# Patient Record
Sex: FEMALE | Race: OTHER | Employment: UNEMPLOYED | URBAN - METROPOLITAN AREA
[De-identification: names, ages, dates, MRNs, and addresses within clinical notes are randomized per-mention and may not be internally consistent; named-entity substitution may affect disease eponyms.]

---

## 2019-02-08 ENCOUNTER — HOSPITAL ENCOUNTER (EMERGENCY)
Age: 34
Discharge: HOME | End: 2019-02-08
Attending: EMERGENCY MEDICINE | Admitting: EMERGENCY MEDICINE
Payer: SUBSIDIZED

## 2019-02-08 VITALS
BODY MASS INDEX: 31.28 KG/M2 | HEIGHT: 62 IN | WEIGHT: 170 LBS | TEMPERATURE: 97.7 F | DIASTOLIC BLOOD PRESSURE: 94 MMHG | RESPIRATION RATE: 18 BRPM | SYSTOLIC BLOOD PRESSURE: 171 MMHG | OXYGEN SATURATION: 98 % | HEART RATE: 74 BPM

## 2019-02-08 DIAGNOSIS — G51.0 BELL'S PALSY: Primary | ICD-10-CM

## 2019-02-08 LAB
APPEARANCE UR: CLEAR
BACTERIA URNS QL MICRO: NEGATIVE /HPF
BILIRUB UR QL: NEGATIVE
COLOR UR: ABNORMAL
EPITH CASTS URNS QL MICRO: ABNORMAL /LPF
GLUCOSE UR STRIP.AUTO-MCNC: >1000 MG/DL
HCG UR QL: NEGATIVE
HGB UR QL STRIP: NEGATIVE
HYALINE CASTS URNS QL MICRO: ABNORMAL /LPF (ref 0–5)
KETONES UR QL STRIP.AUTO: NEGATIVE MG/DL
LEUKOCYTE ESTERASE UR QL STRIP.AUTO: NEGATIVE
NITRITE UR QL STRIP.AUTO: NEGATIVE
PH UR STRIP: 6 [PH] (ref 5–8)
PROT UR STRIP-MCNC: NEGATIVE MG/DL
RBC #/AREA URNS HPF: ABNORMAL /HPF (ref 0–5)
SP GR UR REFRACTOMETRY: 1.01 (ref 1–1.03)
UR CULT HOLD, URHOLD: NORMAL
UROBILINOGEN UR QL STRIP.AUTO: 0.2 EU/DL (ref 0.2–1)
WBC URNS QL MICRO: ABNORMAL /HPF (ref 0–4)

## 2019-02-08 PROCEDURE — 81025 URINE PREGNANCY TEST: CPT

## 2019-02-08 PROCEDURE — 81001 URINALYSIS AUTO W/SCOPE: CPT

## 2019-02-08 PROCEDURE — 99284 EMERGENCY DEPT VISIT MOD MDM: CPT

## 2019-02-08 PROCEDURE — 99283 EMERGENCY DEPT VISIT LOW MDM: CPT

## 2019-02-08 PROCEDURE — 74011250637 HC RX REV CODE- 250/637: Performed by: EMERGENCY MEDICINE

## 2019-02-08 RX ORDER — ACYCLOVIR 800 MG/1
800 TABLET ORAL
Qty: 35 TAB | Refills: 0 | Status: SHIPPED | OUTPATIENT
Start: 2019-02-08 | End: 2019-02-15

## 2019-02-08 RX ORDER — ACYCLOVIR 800 MG/1
800 TABLET ORAL
Status: COMPLETED | OUTPATIENT
Start: 2019-02-08 | End: 2019-02-08

## 2019-02-08 RX ADMIN — ACYCLOVIR 800 MG: 800 TABLET ORAL at 11:26

## 2019-02-08 NOTE — ED PROVIDER NOTES
'started on 1/31/ seen at [facility] on 2/2, 'told has bells palsy'; 'not getting better'; no eye pain/ redness/  c/o 
pt denies numbness/ trauma, vison changes, diff swallowing, CP, SOB, Abd pain, F/Ch, N/V, D/Cons or other current systemic complaints No past medical history on file. No past surgical history on file. No family history on file. Social History Socioeconomic History  Marital status:  Spouse name: Not on file  Number of children: Not on file  Years of education: Not on file  Highest education level: Not on file Social Needs  Financial resource strain: Not on file  Food insecurity - worry: Not on file  Food insecurity - inability: Not on file  Transportation needs - medical: Not on file  Transportation needs - non-medical: Not on file Occupational History  Not on file Tobacco Use  Smoking status: Not on file Substance and Sexual Activity  Alcohol use: Not on file  Drug use: Not on file  Sexual activity: Not on file Other Topics Concern  Not on file Social History Narrative  Not on file ALLERGIES: Patient has no known allergies. Review of Systems Constitutional: Negative for appetite change, diaphoresis and fever. HENT: Negative for trouble swallowing and voice change. Respiratory: Negative for chest tightness and shortness of breath. Gastrointestinal: Negative for abdominal pain. Genitourinary: Negative for dysuria. Neurological: Positive for facial asymmetry and numbness. Negative for headaches. Vitals:  
 02/08/19 1012 BP: (!) 171/94 Pulse: 74 Resp: 18 Temp: 97.7 °F (36.5 °C) SpO2: 98% Weight: 77.1 kg (170 lb) Height: 5' 2\" (1.575 m) Physical Exam  
Constitutional: She is oriented to person, place, and time. She appears well-developed and well-nourished.   
NAD, AxOx4, speaking in complete sentences, L facial droop/ cant wrinkle forehead/ exam consistent w/ Jarratt; HENT:  
Head: Normocephalic and atraumatic. Right Ear: External ear normal.  
Left Ear: External ear normal.  
Nose: Nose normal.  
Mouth/Throat: Oropharynx is clear and moist.  
Cn intact Eyes: Conjunctivae and EOM are normal. Pupils are equal, round, and reactive to light. Right eye exhibits no discharge. Left eye exhibits no discharge. No scleral icterus. Neck: Normal range of motion. Neck supple. No JVD present. No tracheal deviation present. Cardiovascular: Normal rate, regular rhythm, normal heart sounds and intact distal pulses. Exam reveals no gallop and no friction rub. No murmur heard. Pulmonary/Chest: Effort normal and breath sounds normal. No respiratory distress. She has no wheezes. She has no rales. She exhibits no tenderness. Abdominal: Soft. Bowel sounds are normal. There is no tenderness. There is no rebound and no guarding. Genitourinary: Rectal exam shows guaiac negative stool. No vaginal discharge found. Genitourinary Comments: Pt denies urinary/ vaginal complaints Musculoskeletal: Normal range of motion. She exhibits no edema, tenderness or deformity. Neurological: She is alert and oriented to person, place, and time. No cranial nerve deficit. She exhibits normal muscle tone. Coordination normal.  
pt has motor/ CV/ Sensation grossly intact to all extremities, R = L in strength;  
Skin: Skin is warm and dry. No rash noted. No erythema. No pallor. Psychiatric: She has a normal mood and affect. Her behavior is normal. Thought content normal.  
Nursing note and vitals reviewed. MDM Procedures 11:05 AM L facial droop consistent w/ bells; start acyclovir neurology follow-up; Lani Sharpe  results have been reviewed with her. She has been counseled regarding her diagnosis.   She verbally conveys understanding and agreement of the signs, symptoms, diagnosis, treatment and prognosis and additionally agrees to Call/ Arrange follow up as recommended with Dr. None in 24 - 48 hours. She also agrees with the care-plan and conveys that all of her questions have been answered. I have also put together some discharge instructions for her that include: 1) educational information regarding their diagnosis, 2) how to care for their diagnosis at home, as well a 3) list of reasons why they would want to return to the ED prior to their follow-up appointment, should their condition change or for concerns.

## 2019-02-08 NOTE — ED TRIAGE NOTES
Pt arrives with partial paralysis on left side of face that started on 2/2/19. Reports that face is painful, which started Jan 31 before paralysis started. Was dx with Bell's Palsy at [facility] and was prescribed medication for pain (she thinks ibuprofen).

## 2019-02-08 NOTE — DISCHARGE INSTRUCTIONS
Patient Education        Bell's Palsy: Care Instructions  Your Care Instructions    Bell's palsy is paralysis or weakness of the muscles on one side of the face. Often people with Bell's palsy have a droop on one side of the mouth and have trouble completely shutting the eye on the same side. Bell's palsy can also interfere with the sense of taste. These things happen when a nerve in your face becomes inflamed. Bell's palsy is not caused by a stroke. The cause of the nerve inflammation is not known. But some experts think that a virus may cause it. Because of this, doctors sometimes prescribe antiviral medicine to treat it. You also may get medicine to reduce swelling. Bell's palsy usually gets better on its own in a few weeks or months. Follow-up care is a key part of your treatment and safety. Be sure to make and go to all appointments, and call your doctor if you are having problems. It's also a good idea to know your test results and keep a list of the medicines you take. How can you care for yourself at home? · Take your medicines exactly as prescribed. Call your doctor if you think you are having a problem with your medicine. You will get more details on the specific medicines your doctor prescribes. · Use artificial tears or ointment if your eyes are too dry. Bell's palsy can make your lower eyelid droop, causing a dry eye. · If you cannot completely close your eye, consider using an eye patch while you sleep. · Help yourself blink by using your finger to close and open your eyelid. This may help keep your eye moist.  · Wear glasses or goggles to keep dust and dirt out of your eye. · As feeling comes back to your face, massage your forehead, cheeks, and lips. Massage may make the muscles in your face stronger. · Brush and floss your teeth often to help prevent tooth decay. Bell's palsy can dry up the spit on one side of your mouth. This increases the risk of tooth decay.   When should you call for help?  Call 911 anytime you think you may need emergency care. For example, call if:    · You have symptoms of a stroke. These may include:  ? Sudden numbness, tingling, weakness, or loss of movement in your face, arm, or leg, especially on only one side of your body. ? Sudden vision changes. ? Sudden trouble speaking. ? Sudden confusion or trouble understanding simple statements. ? Sudden problems with walking or balance. ? A sudden, severe headache that is different from past headaches.    Call your doctor now or seek immediate medical care if:    · You have numbness or weakness that spreads beyond one side of your face.     · You have a skin rash or eye pain or redness, or light bothers your eyes.     · You have a new or worse headache.    Watch closely for changes in your health, and be sure to contact your doctor if:    · You do not get better as expected. Where can you learn more? Go to http://melvin-alli.info/. Enter P168 in the search box to learn more about \"Bell's Palsy: Care Instructions. \"  Current as of: Zoila 3, 2018  Content Version: 11.9  © 3144-0081 [employer]. Care instructions adapted under license by [employer] (which disclaims liability or warranty for this information). If you have questions about a medical condition or this instruction, always ask your healthcare professional. [address] any warranty or liability for your use of this information.

## 2019-09-23 ENCOUNTER — OFFICE VISIT (OUTPATIENT)
Dept: OBGYN CLINIC | Age: 34
End: 2019-09-23

## 2019-09-23 VITALS
HEIGHT: 62 IN | WEIGHT: 187 LBS | BODY MASS INDEX: 34.41 KG/M2 | DIASTOLIC BLOOD PRESSURE: 70 MMHG | SYSTOLIC BLOOD PRESSURE: 122 MMHG

## 2019-09-23 DIAGNOSIS — Z34.02 ENCOUNTER FOR SUPERVISION OF NORMAL FIRST PREGNANCY IN SECOND TRIMESTER: Primary | ICD-10-CM

## 2019-09-23 DIAGNOSIS — Z01.419 WELL FEMALE EXAM WITH ROUTINE GYNECOLOGICAL EXAM: ICD-10-CM

## 2019-09-23 DIAGNOSIS — Z34.80 SUPERVISION OF OTHER NORMAL PREGNANCY: ICD-10-CM

## 2019-09-23 RX ORDER — NYSTATIN AND TRIAMCINOLONE ACETONIDE 100000; 1 [USP'U]/G; MG/G
OINTMENT TOPICAL 2 TIMES DAILY
Qty: 30 G | Refills: 0 | Status: SHIPPED | OUTPATIENT
Start: 2019-09-23 | End: 2019-09-30

## 2019-09-23 NOTE — PROGRESS NOTES
Current pregnancy history:    Thea Banks is a ,  26 y.o. female OTHER Patient's last menstrual period was 2019 (exact date). .  She presents for the evaluation of amenorrhea and a positive pregnancy test.    LMP history:  The date of her LMP is not certain. Her last menstrual period was normal and lasted for 4 to 5 days. A urine pregnancy test was positive a few weeks ago. She was not on the pill at conception. By LMP she is 12w4d with an YENNIFER of 20. Ultrasound data:  She had an  ultrasound done by the ultrasound tech today which revealed a viable harrison pregnancy with a gestational age of 16 weeks and 2 days giving an [facility] of 20. Pregnancy symptoms:    Since her LMP she has experienced  urinary frequency, breast tenderness, and nausea. She has not been vomiting over the last few weeks. Associated signs and symptoms which she denies: dysuria, discharge, vaginal bleeding. She states she has gained weight:  Approximately 5 pounds over the last few weeks. Relevant past pregnancy history:   She has the following pregnancy history: Her last pregnancy was uncomplicated. She has no history of  delivery. Relevant past medical history:(relevant to this pregnancy): noncontributory. Substance history: negative for alcohol, tobacco and street drugs. Positive for nothing. Exposure history: There is/are no indoor cat/s in the home. The patient was instructed to not change the cat litter. She admits close contact with children on a regular basis. She has had chicken pox or the vaccine in the past.   Patient denies issues with domestic violence. Genetic Screening/Teratology Counseling: (Includes patient, baby's father, or anyone in either family with:)  3.  Patient's age >/= 28 at [facility]?-- yes  . 2.   Thalassemia ([place], [place],  [address], or  background): MCV<80?--no.     3.  Neural tube defect (meningomyelocele, spina bifida, anencephaly)?--no.   4.  Congenital heart defect?--no.  5.  Down syndrome?--no.   6.  Ulisses-Sachs ([place], [place])?--no.   7.  Canavan's Disease?--no.   8.  Familial Dysautonomia?--no.   9.  Sickle cell disease or trait ()? --no   The patient has not been tested for sickle trait  10. Hemophilia or other blood disorders?--no. 11.  Muscular dystrophy?--no. 12.  Cystic fibrosis?--no. 13.  Rankin's Chorea?--no. 14.  Mental retardation/autism (if yes was person tested for Fragile X)?--no. 15.  Other inherited genetic or chromosomal disorder?--no. 12.  Maternal metabolic disorder (DM, PKU, etc)?--no. 17.  Patient or FOB with a child with a birth defect not listed above?--no.  17a. Patient or FOB with a birth defect themselves?--no. 18.  Recurrent pregnancy loss, or stillbirth?--no. 19.  Any medications since LMP other than prenatal vitamins (include vitamins, supplements, OTC meds, drugs, alcohol)?--no. 20.  Any other genetic/environmental exposure to discuss?--no. Infection History:  1. Lives with someone with TB or TB exposed?--no.   2.  Patient or partner has history of genital herpes?--no.  3.  Rash or viral illness since LMP?--no.    4.  History of STD (GC, CT, HPV, syphilis, HIV)? --no   5. Other: OTHER? No past medical history on file. No past surgical history on file. Social History     Occupational History    Not on file   Tobacco Use    Smoking status: Not on file   Substance and Sexual Activity    Alcohol use: Not on file    Drug use: Not on file    Sexual activity: Not on file     No family history on file.   OB History    Para Term  AB Living   1             SAB TAB Ectopic Molar Multiple Live Births                    # Outcome Date GA Lbr Yosvany/2nd Weight Sex Delivery Anes PTL Lv   1 Current              No Known Allergies  Prior to Admission medications    Not on File        Review of Systems: History obtained from the patient  Constitutional: negative for weight loss, fever, night sweats  HEENT: negative for hearing loss, earache, congestion, snoring, sore throat  CV: negative for chest pain, palpitations, edema  Resp: negative for cough, shortness of breath, wheezing  Breast: negative for breast lumps, nipple discharge, galactorrhea  GI: negative for change in bowel habits, abdominal pain, black or bloody stools  : negative for frequency, dysuria, hematuria, vaginal discharge  MSK: negative for back pain, joint pain, muscle pain  Skin: negative for itching, rash, hives  Neuro: negative for dizziness, headache, confusion, weakness  Psych: negative for anxiety, depression, change in mood  Heme/lymph: negative for bleeding, bruising, pallor    Objective:  Visit Vitals  LMP 01/02/2019 (Exact Date)       Physical Exam:     Constitutional  · Appearance: well-nourished, well developed, alert, in no acute distress    HENT  · Head  · Face: appears normal  · Eyes: appear normal  · Ears: normal  · Mouth: normal  · Lips: no lesions    Neck  · Inspection/Palpation: normal appearance, no masses or tenderness  · Lymph Nodes: no lymphadenopathy present  · Thyroid: gland size normal, nontender, no nodules or masses present on palpation    Chest  · Respiratory Effort: breathing unlabored    Breasts  · Inspection of Breasts: breasts symmetrical, no skin changes, no discharge present, nipple appearance normal, no skin retraction present  · Palpation of Breasts and Axillae: no masses present on palpation, no breast tenderness  · Axillary Lymph Nodes: no lymphadenopathy present    Gastrointestinal  · Abdominal Examination: abdomen non-tender to palpation, normal bowel sounds, no masses present  · Liver and spleen: no hepatomegaly present, spleen not palpable  · Hernias: no hernias identified    Genitourinary  · External Genitalia: normal appearance for age, no discharge present, no tenderness present, no inflammatory lesions present, no masses present, no atrophy present  · Vagina: normal vaginal vault without central or paravaginal defects, no discharge present, no inflammatory lesions present, no masses present  · Bladder: non-tender to palpation  · Urethra: appears normal  · Cervix: normal   · Uterus: enlarged, normal shape, soft  · Adnexa: no adnexal tenderness present, no adnexal masses present  · Perineum: perineum within normal limits, no evidence of trauma, no rashes or skin lesions present  · Anus: anus within normal limits, no hemorrhoids present  · Inguinal Lymph Nodes: no lymphadenopathy present    Skin  · General Inspection: no rash, no lesions identified    Neurologic/Psychiatric  · Mental Status:  · Orientation: grossly oriented to person, place and time  · Mood and Affect: mood normal, affect appropriate    Assessment:   Intrauterine pregnancy with the following problems identified:   [place]  last pregnancy 7 years ago  GDM with last pregnancy- 1 hour glucola at next visit  [facility] referral, Pt declined additional genetic testing/screening stating she would not terminate under any circumstances and would not want to know until after delivery it there was a problem. Plan:     Offered CF testing, CVS, Nuchal Translucency, MSAFP, amnio, and discussed NIPT  Course of pregnancy discussed including visit schedule, routine U/S, glucola testing, etc.  Avoid alcoholic beverages and illicit/recreational drugs use  Take prenatal vitamins or folic acid daily. Hospital and practice style discussed with coverage system. Discussed nutrition, toxoplasmosis precautions, sexual activity, exercise, need for influenza vaccine, environmental and work hazards, travel advice, screen for domestic violence, need for seat belts. Discussed seafood, unpasteurized dairy products, deli meat, artificial sweeteners, and caffeine. Information on prenatal classes/breastfeeding given. Information on circumcision given  Patient encouraged not to smoke.   Discussed current prescription drug use. Given medication list.  Discussed the use of over the counter medications and chemicals. Route of delivery discussed, including risks, benefits, and alternatives of  versus repeat LTCS.   Pt understands risk of hemorrhage during pregnancy and post delivery and would accept blood products if necessary in life-threatening emergencies

## 2019-09-23 NOTE — PATIENT INSTRUCTIONS
Learning About Pregnancy  Your Care Instructions    Your health in the early weeks of your pregnancy is particularly important for your baby's health. Take good care of yourself. Anything you do that harms your body can also harm your baby. Make sure to go to all of your doctor appointments. Regular checkups will help keep you and your baby healthy. How can you care for yourself at home? Diet    · Eat a balanced diet. Make sure your diet includes plenty of beans, peas, and leafy green vegetables.     · Do not skip meals or go for many hours without eating. If you are nauseated, try to eat a small, healthy snack every 2 to 3 hours.     · Do not eat fish that has a high level of mercury, such as shark, swordfish, or mackerel. Do not eat more than one can of tuna each week.     · Drink plenty of fluids, enough so that your urine is light yellow or clear like water. If you have kidney, heart, or liver disease and have to limit fluids, talk with your doctor before you increase the amount of fluids you drink.     · Cut down on caffeine, such as coffee, tea, and cola.     · Do not drink alcohol, such as beer, wine, or hard liquor.     · Take a multivitamin that contains at least 400 micrograms (mcg) of folic acid to help prevent birth defects. Fortified cereal and whole wheat bread are good additional sources of folic acid.     · Increase the calcium in your diet. Try to drink a quart of skim milk each day. You may also take calcium supplements and choose foods such as cheese and yogurt.    Lifestyle    · Make sure you go to your follow-up appointments.     · Get plenty of rest. You may be unusually tired while you are pregnant.     · Get at least 30 minutes of exercise on most days of the week. Walking is a good choice. If you have not exercised in the past, start out slowly. Take several short walks each day.     · Do not smoke. If you need help quitting, talk to your doctor about stop-smoking programs.  These can increase your chances of quitting for good.     · Do not touch cat feces or litter boxes. Also, wash your hands after you handle raw meat, and fully cook all meat before you eat it. Wear gloves when you work in the yard or garden, and wash your hands well when you are done. Cat feces, raw or undercooked meat, and contaminated dirt can cause an infection that may harm your baby or lead to a miscarriage.     · Do not use saunas or hot tubs. Raising your body temperature may harm your baby.     · Avoid chemical fumes, paint fumes, or poisons.     · Do not use illegal drugs or alcohol. Medicines    · Review all of your medicines with your doctor. Some of your routine medicines may need to be changed to protect your baby.     · Use acetaminophen (Tylenol) to relieve minor problems, such as a mild headache or backache or a mild fever with cold symptoms. Do not use nonsteroidal anti-inflammatory drugs (NSAIDs), such as ibuprofen (Advil, Motrin) or naproxen (Aleve), unless your doctor says it is okay.     · Do not take two or more pain medicines at the same time unless the doctor told you to. Many pain medicines have acetaminophen, which is Tylenol. Too much acetaminophen (Tylenol) can be harmful.     · Take your medicines exactly as prescribed. Call your doctor if you think you are having a problem with your medicine.    To manage morning sickness    · If you feel sick when you first wake up, try eating a small snack (such as crackers) before you get out of bed. Allow some time to digest the snack, and then get out of bed slowly.     · Do not skip meals or go for long periods without eating. An empty stomach can make nausea worse.     · Eat small, frequent meals instead of three large meals each day.     · Drink plenty of fluids.  Sports drinks, such as Gatorade or Powerade, are good choices.     · Eat foods that are high in protein but low in fat.     · If you are taking iron supplements, ask your doctor if they are necessary. Iron can make nausea worse.     · Avoid any smells, such as coffee, that make you feel sick.     · Get lots of rest. Morning sickness may be worse when you are tired. Follow-up care is a key part of your treatment and safety. Be sure to make and go to all appointments, and call your doctor if you are having problems. It's also a good idea to know your test results and keep a list of the medicines you take. Where can you learn more? Go to http://melvin-alli.info/. Enter W361 in the search box to learn more about \"Learning About Pregnancy. \"  Current as of: May 29, 2019  Content Version: 12.2  © 0399-2410 [employer]. Care instructions adapted under license by [email or web address] (which disclaims liability or warranty for this information). If you have questions about a medical condition or this instruction, always ask your healthcare professional. [address] any warranty or liability for your use of this information.

## 2019-09-24 LAB
ABO GROUP BLD: NORMAL
BLD GP AB SCN SERPL QL: NEGATIVE
ERYTHROCYTE [DISTWIDTH] IN BLOOD BY AUTOMATED COUNT: 13 % (ref 12.3–15.4)
HBV SURFACE AG SERPL QL IA: NEGATIVE
HCT VFR BLD AUTO: 42.3 % (ref 34–46.6)
HGB BLD-MCNC: 14.6 G/DL (ref 11.1–15.9)
HIV 1+2 AB+HIV1 P24 AG SERPL QL IA: NON REACTIVE
MCH RBC QN AUTO: 32.2 PG (ref 26.6–33)
MCHC RBC AUTO-ENTMCNC: 34.5 G/DL (ref 31.5–35.7)
MCV RBC AUTO: 93 FL (ref 79–97)
PLATELET # BLD AUTO: 243 X10E3/UL (ref 150–450)
RBC # BLD AUTO: 4.54 X10E6/UL (ref 3.77–5.28)
RH BLD: POSITIVE
RPR SER QL: NON REACTIVE
RUBV IGG SERPL IA-ACNC: 3.16 INDEX
WBC # BLD AUTO: 9.5 X10E3/UL (ref 3.4–10.8)

## 2019-09-25 LAB — BACTERIA UR CULT: NO GROWTH

## 2019-09-26 LAB
C TRACH RRNA CVX QL NAA+PROBE: NEGATIVE
CYTOLOGIST CVX/VAG CYTO: NORMAL
CYTOLOGY CVX/VAG DOC CYTO: NORMAL
CYTOLOGY CVX/VAG DOC THIN PREP: NORMAL
DX ICD CODE: NORMAL
HPV I/H RISK 1 DNA CVX QL PROBE+SIG AMP: NEGATIVE
Lab: NORMAL
N GONORRHOEA RRNA CVX QL NAA+PROBE: NEGATIVE
OTHER STN SPEC: NORMAL
STAT OF ADQ CVX/VAG CYTO-IMP: NORMAL
T VAGINALIS RRNA SPEC QL NAA+PROBE: NEGATIVE

## 2019-10-21 ENCOUNTER — ROUTINE PRENATAL (OUTPATIENT)
Dept: OBGYN CLINIC | Age: 34
End: 2019-10-21

## 2019-10-21 VITALS — SYSTOLIC BLOOD PRESSURE: 120 MMHG | WEIGHT: 187 LBS | BODY MASS INDEX: 34.2 KG/M2 | DIASTOLIC BLOOD PRESSURE: 82 MMHG

## 2019-10-21 DIAGNOSIS — O09.522 AMA (ADVANCED MATERNAL AGE) MULTIGRAVIDA 35+, SECOND TRIMESTER: Primary | ICD-10-CM

## 2019-10-21 DIAGNOSIS — Z34.80 SUPERVISION OF OTHER NORMAL PREGNANCY: ICD-10-CM

## 2019-10-21 DIAGNOSIS — O24.419 GESTATIONAL DIABETES MELLITUS (GDM) IN SECOND TRIMESTER, GESTATIONAL DIABETES METHOD OF CONTROL UNSPECIFIED: ICD-10-CM

## 2019-10-21 NOTE — PROGRESS NOTES
Pt doing well, see prenatal flowsheet. msafp today  Pt declined additional genetic testing/screening stating she would not terminate under any circumstances and would not want to know until after delivery it there was a problem.

## 2019-10-23 LAB
AFP ADJ MOM SERPL: 0.41
AFP INTERP SERPL-IMP: NORMAL
AFP INTERP SERPL-IMP: NORMAL
AFP SERPL-MCNC: 11.7 NG/ML
AGE AT DELIVERY: 36.3 YR
COMMENT, 018013: NORMAL
GA METHOD: NORMAL
GA: 16 WEEKS
GLUCOSE 1H P 50 G GLC PO SERPL-MCNC: 335 MG/DL (ref 65–129)
IDDM PATIENT QL: NO
MULTIPLE PREGNANCY: NO
NEURAL TUBE DEFECT RISK FETUS: NORMAL %
RESULTS, 017004: NORMAL

## 2019-10-24 ENCOUNTER — TELEPHONE (OUTPATIENT)
Dept: DIABETES SERVICES | Age: 34
End: 2019-10-24

## 2019-10-24 NOTE — PROGRESS NOTES
Pt notified and referrals sent to [facility], [facility], and endo. Pt will reach out to me if she has not heard from any of these specialities by next week.

## 2019-10-24 NOTE — TELEPHONE ENCOUNTER
I used the language line to place a call to this patient's cell phone in an effort to schedule an appointment. The patient needs to be seen for gestational diabetes. No one answered so a VM was left.

## 2019-11-04 ENCOUNTER — OFFICE VISIT (OUTPATIENT)
Dept: ENDOCRINOLOGY | Age: 34
End: 2019-11-04

## 2019-11-04 VITALS
BODY MASS INDEX: 34.61 KG/M2 | RESPIRATION RATE: 18 BRPM | HEIGHT: 62 IN | HEART RATE: 81 BPM | OXYGEN SATURATION: 98 % | TEMPERATURE: 97.8 F | DIASTOLIC BLOOD PRESSURE: 76 MMHG | SYSTOLIC BLOOD PRESSURE: 135 MMHG | WEIGHT: 188.1 LBS

## 2019-11-04 DIAGNOSIS — E11.65 UNCONTROLLED TYPE 2 DIABETES MELLITUS WITH HYPERGLYCEMIA (HCC): ICD-10-CM

## 2019-11-04 DIAGNOSIS — O24.414 INSULIN CONTROLLED GESTATIONAL DIABETES MELLITUS (GDM) IN SECOND TRIMESTER: Primary | ICD-10-CM

## 2019-11-04 LAB — HBA1C MFR BLD HPLC: 8.6 %

## 2019-11-04 RX ORDER — SYRINGE AND NEEDLE,INSULIN,1ML 31GX15/64"
SYRINGE, EMPTY DISPOSABLE MISCELLANEOUS
Qty: 50 PEN NEEDLE | Refills: 6 | Status: SHIPPED | OUTPATIENT
Start: 2019-11-04

## 2019-11-04 NOTE — PROGRESS NOTES
HISTORY OF PRESENT ILLNESS  Laurel Medrano is a 26 y.o. female. HPI  Initial visit for  GDM   Referred by Martín Gill       Thru 17 weeks of GA    live children 1 miscarriage     3 and 4 the pregnancies - she had diabetes ,   3rd - diet only in [place]  13years old  [place]ND   [place]    [place]    [address] [place] [address]    - 8years old  FTND       LMP -  2019   YENNIFER -  2019     Pt had a hospital admission  In [place]  in 2016 and was told high sugars ,but later never had any appts       .     Past Medical History:   Diagnosis Date    Gestational diabetes mellitus (GDM)        Social History     Socioeconomic History    Marital status:      Spouse name: Not on file    Number of children: Not on file    Years of education: Not on file    Highest education level: Not on file   Occupational History    Not on file   Social Needs    Financial resource strain: Not on file    Food insecurity:     Worry: Not on file     Inability: Not on file    Transportation needs:     Medical: Not on file     Non-medical: Not on file   Tobacco Use    Smoking status: Never Smoker    Smokeless tobacco: Never Used   Substance and Sexual Activity    Alcohol use: Never     Frequency: Never    Drug use: Never    Sexual activity: Yes     Partners: Male     Birth control/protection: None   Lifestyle    Physical activity:     Days per week: Not on file     Minutes per session: Not on file    Stress: Not on file   Relationships    Social connections:     Talks on phone: Not on file     Gets together: Not on file     Attends Anglican service: Not on file     Active member of club or organization: Not on file     Attends meetings of clubs or organizations: Not on file     Relationship status: Not on file    Intimate partner violence:     Fear of current or ex partner: Not on file     Emotionally abused: Not on file     Physically abused: Not on file     Forced sexual activity: Not on file   Other Topics Concern    Not on file   Social History Narrative    Not on file       Family History   Problem Relation Age of Onset    Hypertension Mother     Diabetes Mother        Review of Systems   Constitutional: Negative. HENT: Negative. Eyes: Negative. Respiratory: Negative. Cardiovascular: Negative. Gastrointestinal: Negative. Genitourinary: Negative. Musculoskeletal: Negative. Skin: Negative. Neurological: Negative. Endo/Heme/Allergies: Negative. Psychiatric/Behavioral: Negative. Physical Exam   Constitutional: She is oriented to person, place, and time. She appears well-developed and well-nourished. HENT:   Head: Normocephalic. Eyes: Pupils are equal, round, and reactive to light. Conjunctivae and EOM are normal.   Neck: Normal range of motion. Neck supple. Cardiovascular: Normal rate and regular rhythm. Pulmonary/Chest: Effort normal and breath sounds normal.   Abdominal: Soft. Bowel sounds are normal.   Musculoskeletal: Normal range of motion. Neurological: She is alert and oriented to person, place, and time. She has normal reflexes. Skin: Skin is warm and dry. Psychiatric: She has a normal mood and affect. ONE HOUR GLUCOLA SCREENING TEST  MG      ASSESSMENT and PLAN    1. Gestational diabetes  : thru   17 weeks  GA   No A1c to go by - could not be done   No sugar log to go by   Discussed patho-physiology of DM  during pregnancy     She is educated about the importance of glycemic control for healthy baby and safe delivery. She is told to check blood sugars before meals and one hour after meals and at bed time on some days (2 hours post dinner). She is educated about the targets being different during pregnancy   Fasting below 90 mg and one hour post prandial being below 130 mg     She is started on basal insulin   She is told to drop off her log/meter for review.     She will call office if blood sugars are staying about the target range for 2 consecutive days     She is educated about possibility of worsening of retinopathy with aggressive glycemic control.               2. Type 2 diabetes uncontrolled  : a1c is over 7 %  By POC   Ran the a1c anyway to get an idea   Discussed DM 2 patho-physiology extensively   Reviewed the glucose log : no  Started on NPH insulin  at bedtime. Patient is advised about checking blood sugars 4 times a day and maintaining log book. The danger of having low blood sugars has been explained with inappropriate use of insulin  Patient voiced understanding and using the printed instructions at home. Hypoglycemia management has been explained to the patient. Carbohydrate counting discussed with the patient     lab results and schedule of future lab studies reviewed with patient  specific diabetic recommendations: diabetic diet discussed in detail, written exchange diet given, home glucose monitoring emphasized, home glucose monitoring demonstrated and taught, all medications, side effects and compliance discussed carefully, use and side effects of insulin is taught, foot care discussed and Podiatry visits discussed, annual eye examinations at Ophthalmology discussed, glycohemoglobin and other lab monitoring discussed, long term diabetic complications discussed and labs immediately prior to next visit    2. Hypoglycemia :  Educated on treating the hypoglycemia.       > 50 % of time is spent on counseling   Patient voiced understanding her plan of care

## 2019-11-04 NOTE — Clinical Note
11/10/19 Patient: Rosa Valdovinos YOB: 1985 Date of Visit: 11/4/2019 None None ([phone]) Patient Stated That They Have No Pcp 
VIA Dear None, Thank you for referring Ms. Rosa Valdovinos to [address] [place] [address] for evaluation. My notes for this consultation are attached. If you have questions, please do not hesitate to call me. I look forward to following your patient along with you. Sincerely, Asmita Ferrell MD

## 2019-11-04 NOTE — PROGRESS NOTES
Room 3    Identified pt with two pt identifiers(name and ). Reviewed record in preparation for visit and have obtained necessary documentation. All patient medications has been reviewed. Chief Complaint   Patient presents with    Gestational Diabetes     17 weeks       Health Maintenance Due   Topic    DTaP/Tdap/Td series (1 - Tdap)    Influenza Age 5 to Adult        Vitals:    19 1106   BP: 135/76   Pulse: 81   Resp: 18   Temp: 97.8 °F (36.6 °C)   TempSrc: Oral   SpO2: 98%   Weight: 188 lb 1.6 oz (85.3 kg)   Height: 5' 2\" (1.575 m)   PainSc:   0 - No pain   LMP: 2019       Wt Readings from Last 3 Encounters:   19 188 lb 1.6 oz (85.3 kg)   10/21/19 187 lb (84.8 kg)   19 187 lb (84.8 kg)     Temp Readings from Last 3 Encounters:   19 97.8 °F (36.6 °C) (Oral)   19 97.7 °F (36.5 °C)     BP Readings from Last 3 Encounters:   19 135/76   10/21/19 120/82   19 122/70     Pulse Readings from Last 3 Encounters:   19 81   19 74       No results found for: HBA1C, HGBE8, IIO2EETJ, RFV9OPQI, BYD4XCNI    Coordination of Care Questionnaire:   1) Have you been to an emergency room, urgent care, or hospitalized since your last visit?   no       2. Have seen or consulted any other health care provider since your last visit? NO    3) Do you have an Advanced Directive/ Living Will in place? NO  If yes, do we have a copy on file NO  If no, would you like information NO    Patient is accompanied by mother and daughter I have received verbal consent from Alex Otto to discuss any/all medical information while they are present in the room.

## 2019-11-04 NOTE — PATIENT INSTRUCTIONS
Do not skip meals  Do not eat in between meals    Reduce carbs- pasta, rice, potatoes, bread   Do not drink juices or sodas  Do not eat peanut butter     Do not eat sugar free cookies and cakes   Do not eat peaches,  pineapples, grapes, oranges  And fruit medleys       ---------------------------------------------------------------------------------------------------------------------------------      [employer] reli-on machine    Test strips       Start on novolin NPH insulin   16 units at bed time   ( reli-on)

## 2019-11-04 NOTE — LETTER
11/10/2019 7:19 PM 
 
Patient:  Wagner Connors YOB: 1985 Date of Visit: 2019 Dear No Recipients: Thank you for referring Ms. Wagner Connors to me for evaluation/treatment. Below are the relevant portions of my assessment and plan of care. Room 3 Identified pt with two pt identifiers(name and ). Reviewed record in preparation for visit and have obtained necessary documentation. All patient medications has been reviewed. Chief Complaint Patient presents with  Gestational Diabetes 17 weeks Health Maintenance Due Topic  DTaP/Tdap/Td series (1 - Tdap)  Influenza Age 5 to Adult Vitals:  
 19 1106 BP: 135/76 Pulse: 81 Resp: 18 Temp: 97.8 °F (36.6 °C) TempSrc: Oral  
SpO2: 98% Weight: 188 lb 1.6 oz (85.3 kg) Height: 5' 2\" (1.575 m) PainSc:   0 - No pain LMP: 2019 Wt Readings from Last 3 Encounters:  
19 188 lb 1.6 oz (85.3 kg) 10/21/19 187 lb (84.8 kg) 19 187 lb (84.8 kg) Temp Readings from Last 3 Encounters:  
19 97.8 °F (36.6 °C) (Oral) 19 97.7 °F (36.5 °C) BP Readings from Last 3 Encounters:  
19 135/76  
10/21/19 120/82  
19 122/70 Pulse Readings from Last 3 Encounters:  
19 81  
19 74 No results found for: HBA1C, HGBE8, QYG3ZPQZ, KAK3GBII, JTW6DDYZ Coordination of Care Questionnaire:  
1) Have you been to an emergency room, urgent care, or hospitalized since your last visit?   no    
 
2. Have seen or consulted any other health care provider since your last visit? NO 
 
3) Do you have an Advanced Directive/ Living Will in place? NO If yes, do we have a copy on file NO If no, would you like information NO Patient is accompanied by mother and daughter I have received verbal consent from Wagner Connors to discuss any/all medical information while they are present in the room. HISTORY OF PRESENT ILLNESS Mariola Singh is a 26 y.o. female. HPI Initial visit for  GDM Referred by Sj Henley Thru 17 weeks of GA  
 live children 1 miscarriage 3 and 4 the pregnancies - she had diabetes ,  
3rd - diet only in [place]  13years old  [place]ND  
[place]    [facility]    - 8years old  FTND  
 
 
LMP -  2019 YENNIFER -  2019 Pt had a hospital admission  In [place]  in 2016 and was told high sugars ,but later never had any appts Aditya Spike Past Medical History:  
Diagnosis Date  Gestational diabetes mellitus (GDM) Social History Socioeconomic History  Marital status:  Spouse name: Not on file  Number of children: Not on file  Years of education: Not on file  Highest education level: Not on file Occupational History  Not on file Social Needs  Financial resource strain: Not on file  Food insecurity:  
  Worry: Not on file Inability: Not on file  Transportation needs:  
  Medical: Not on file Non-medical: Not on file Tobacco Use  Smoking status: Never Smoker  Smokeless tobacco: Never Used Substance and Sexual Activity  Alcohol use: Never Frequency: Never  Drug use: Never  Sexual activity: Yes  
  Partners: Male Birth control/protection: None Lifestyle  Physical activity:  
  Days per week: Not on file Minutes per session: Not on file  Stress: Not on file Relationships  Social connections:  
  Talks on phone: Not on file Gets together: Not on file Attends Anabaptist service: Not on file Active member of club or organization: Not on file Attends meetings of clubs or organizations: Not on file Relationship status: Not on file  Intimate partner violence:  
  Fear of current or ex partner: Not on file Emotionally abused: Not on file Physically abused: Not on file Forced sexual activity: Not on file Other Topics Concern  Not on file Social History Narrative  Not on file Family History Problem Relation Age of Onset  Hypertension Mother  Diabetes Mother Review of Systems Constitutional: Negative. HENT: Negative. Eyes: Negative. Respiratory: Negative. Cardiovascular: Negative. Gastrointestinal: Negative. Genitourinary: Negative. Musculoskeletal: Negative. Skin: Negative. Neurological: Negative. Endo/Heme/Allergies: Negative. Psychiatric/Behavioral: Negative. Physical Exam  
Constitutional: She is oriented to person, place, and time. She appears well-developed and well-nourished. HENT:  
Head: Normocephalic. Eyes: Pupils are equal, round, and reactive to light. Conjunctivae and EOM are normal.  
Neck: Normal range of motion. Neck supple. Cardiovascular: Normal rate and regular rhythm. Pulmonary/Chest: Effort normal and breath sounds normal.  
Abdominal: Soft. Bowel sounds are normal.  
Musculoskeletal: Normal range of motion. Neurological: She is alert and oriented to person, place, and time. She has normal reflexes. Skin: Skin is warm and dry. Psychiatric: She has a normal mood and affect. ONE HOUR GLUCOLA SCREENING TEST  MG 
 
 
ASSESSMENT and PLAN 1. Gestational diabetes  : thru   17 weeks  GA No A1c to go by - could not be done No sugar log to go by Discussed patho-physiology of DM  during pregnancy She is educated about the importance of glycemic control for healthy baby and safe delivery. She is told to check blood sugars before meals and one hour after meals and at bed time on some days (2 hours post dinner). She is educated about the targets being different during pregnancy Fasting below 90 mg and one hour post prandial being below 130 mg She is started on basal insulin She is told to drop off her log/meter for review. She will call office if blood sugars are staying about the target range for 2 consecutive days She is educated about possibility of worsening of retinopathy with aggressive glycemic control. 
 
  
 
 
 
 
2. Type 2 diabetes uncontrolled  : a1c is over 7 %  By POC Ran the a1c anyway to get an idea Discussed DM 2 patho-physiology extensively Reviewed the glucose log : no 
Started on NPH insulin  at bedtime. Patient is advised about checking blood sugars 4 times a day and maintaining log book. The danger of having low blood sugars has been explained with inappropriate use of insulin  Patient voiced understanding and using the printed instructions at home. Hypoglycemia management has been explained to the patient. Carbohydrate counting discussed with the patient  
 
lab results and schedule of future lab studies reviewed with patient 
specific diabetic recommendations: diabetic diet discussed in detail, written exchange diet given, home glucose monitoring emphasized, home glucose monitoring demonstrated and taught, all medications, side effects and compliance discussed carefully, use and side effects of insulin is taught, foot care discussed and Podiatry visits discussed, annual eye examinations at Ophthalmology discussed, glycohemoglobin and other lab monitoring discussed, long term diabetic complications discussed and labs immediately prior to next visit 2. Hypoglycemia :  Educated on treating the hypoglycemia. > 50 % of time is spent on counseling Patient voiced understanding her plan of care If you have questions, please do not hesitate to call me. I look forward to following Ms. Lorelei Armando along with you. Sincerely, Elana Gustafson MD

## 2019-11-04 NOTE — LETTER
11/10/2019 7:20 PM 
 
Patient:  Aleksandr Santiago YOB: 1985 Date of Visit: 2019 Dear MD Delvin Gandhi [address] [facility] [phone] VIA In Basket 
 : Thank you for referring Ms. Aleksandr Santiago to me for evaluation/treatment. Below are the relevant portions of my assessment and plan of care. Room 3 Identified pt with two pt identifiers(name and ). Reviewed record in preparation for visit and have obtained necessary documentation. All patient medications has been reviewed. Chief Complaint Patient presents with  Gestational Diabetes 17 weeks Health Maintenance Due Topic  DTaP/Tdap/Td series (1 - Tdap)  Influenza Age 5 to Adult Vitals:  
 19 1106 BP: 135/76 Pulse: 81 Resp: 18 Temp: 97.8 °F (36.6 °C) TempSrc: Oral  
SpO2: 98% Weight: 188 lb 1.6 oz (85.3 kg) Height: 5' 2\" (1.575 m) PainSc:   0 - No pain LMP: 2019 Wt Readings from Last 3 Encounters:  
19 188 lb 1.6 oz (85.3 kg) 10/21/19 187 lb (84.8 kg) 19 187 lb (84.8 kg) Temp Readings from Last 3 Encounters:  
19 97.8 °F (36.6 °C) (Oral) 19 97.7 °F (36.5 °C) BP Readings from Last 3 Encounters:  
19 135/76  
10/21/19 120/82  
19 122/70 Pulse Readings from Last 3 Encounters:  
19 81  
19 74 No results found for: HBA1C, HGBE8, JCP6CWAI, DEI7ZUNC, ILD2DROM Coordination of Care Questionnaire:  
1) Have you been to an emergency room, urgent care, or hospitalized since your last visit?   no    
 
2. Have seen or consulted any other health care provider since your last visit? NO 
 
3) Do you have an Advanced Directive/ Living Will in place? NO If yes, do we have a copy on file NO If no, would you like information NO Patient is accompanied by mother and daughter I have received verbal consent from Aleksandr Santiago to discuss any/all medical information while they are present in the room. HISTORY OF PRESENT ILLNESS John Dunn is a 26 y.o. female. HPI Initial visit for  GDM Referred by Ann Gr Thru 17 weeks of GA  
 live children 1 miscarriage 3 and 4 the pregnancies - she had diabetes ,  
3rd - diet only in [place]  13years old  [place]ND  
[place]    [place]    [address] [place] [address]    - 8years old  FTND  
 
 
LMP -  2019 YENNIFER -  2019 Pt had a hospital admission  In [place]  in 2016 and was told high sugars ,but later never had any appts Eugene Palacios Past Medical History:  
Diagnosis Date  Gestational diabetes mellitus (GDM) Social History Socioeconomic History  Marital status:  Spouse name: Not on file  Number of children: Not on file  Years of education: Not on file  Highest education level: Not on file Occupational History  Not on file Social Needs  Financial resource strain: Not on file  Food insecurity:  
  Worry: Not on file Inability: Not on file  Transportation needs:  
  Medical: Not on file Non-medical: Not on file Tobacco Use  Smoking status: Never Smoker  Smokeless tobacco: Never Used Substance and Sexual Activity  Alcohol use: Never Frequency: Never  Drug use: Never  Sexual activity: Yes  
  Partners: Male Birth control/protection: None Lifestyle  Physical activity:  
  Days per week: Not on file Minutes per session: Not on file  Stress: Not on file Relationships  Social connections:  
  Talks on phone: Not on file Gets together: Not on file Attends Taoism service: Not on file Active member of club or organization: Not on file Attends meetings of clubs or organizations: Not on file Relationship status: Not on file  Intimate partner violence:  
  Fear of current or ex partner: Not on file Emotionally abused: Not on file Physically abused: Not on file Forced sexual activity: Not on file Other Topics Concern  Not on file Social History Narrative  Not on file Family History Problem Relation Age of Onset  Hypertension Mother  Diabetes Mother Review of Systems Constitutional: Negative. HENT: Negative. Eyes: Negative. Respiratory: Negative. Cardiovascular: Negative. Gastrointestinal: Negative. Genitourinary: Negative. Musculoskeletal: Negative. Skin: Negative. Neurological: Negative. Endo/Heme/Allergies: Negative. Psychiatric/Behavioral: Negative. Physical Exam  
Constitutional: She is oriented to person, place, and time. She appears well-developed and well-nourished. HENT:  
Head: Normocephalic. Eyes: Pupils are equal, round, and reactive to light. Conjunctivae and EOM are normal.  
Neck: Normal range of motion. Neck supple. Cardiovascular: Normal rate and regular rhythm. Pulmonary/Chest: Effort normal and breath sounds normal.  
Abdominal: Soft. Bowel sounds are normal.  
Musculoskeletal: Normal range of motion. Neurological: She is alert and oriented to person, place, and time. She has normal reflexes. Skin: Skin is warm and dry. Psychiatric: She has a normal mood and affect. ONE HOUR GLUCOLA SCREENING TEST  MG 
 
 
ASSESSMENT and PLAN 1. Gestational diabetes  : thru   17 weeks  GA No A1c to go by - could not be done No sugar log to go by Discussed patho-physiology of DM  during pregnancy She is educated about the importance of glycemic control for healthy baby and safe delivery. She is told to check blood sugars before meals and one hour after meals and at bed time on some days (2 hours post dinner). She is educated about the targets being different during pregnancy Fasting below 90 mg and one hour post prandial being below 130 mg She is started on basal insulin She is told to drop off her log/meter for review. She will call office if blood sugars are staying about the target range for 2 consecutive days She is educated about possibility of worsening of retinopathy with aggressive glycemic control. 
 
  
 
 
 
 
2. Type 2 diabetes uncontrolled  : a1c is over 7 %  By POC Ran the a1c anyway to get an idea Discussed DM 2 patho-physiology extensively Reviewed the glucose log : no 
Started on NPH insulin  at bedtime. Patient is advised about checking blood sugars 4 times a day and maintaining log book. The danger of having low blood sugars has been explained with inappropriate use of insulin  Patient voiced understanding and using the printed instructions at home. Hypoglycemia management has been explained to the patient. Carbohydrate counting discussed with the patient  
 
lab results and schedule of future lab studies reviewed with patient 
specific diabetic recommendations: diabetic diet discussed in detail, written exchange diet given, home glucose monitoring emphasized, home glucose monitoring demonstrated and taught, all medications, side effects and compliance discussed carefully, use and side effects of insulin is taught, foot care discussed and Podiatry visits discussed, annual eye examinations at Ophthalmology discussed, glycohemoglobin and other lab monitoring discussed, long term diabetic complications discussed and labs immediately prior to next visit 2. Hypoglycemia :  Educated on treating the hypoglycemia. > 50 % of time is spent on counseling Patient voiced understanding her plan of care If you have questions, please do not hesitate to call me. I look forward to following Ms. Jordan Rodriguez along with you. Sincerely, Asmita Ferrell MD

## 2019-11-18 ENCOUNTER — ROUTINE PRENATAL (OUTPATIENT)
Dept: OBGYN CLINIC | Age: 34
End: 2019-11-18

## 2019-11-18 VITALS — DIASTOLIC BLOOD PRESSURE: 80 MMHG | SYSTOLIC BLOOD PRESSURE: 120 MMHG | WEIGHT: 185 LBS | BODY MASS INDEX: 33.84 KG/M2

## 2019-11-18 DIAGNOSIS — Z34.80 SUPERVISION OF OTHER NORMAL PREGNANCY: ICD-10-CM

## 2019-11-18 NOTE — PROGRESS NOTES
Pt doing well, see prenatal flowsheet. Started on insulin by endocrinologist but pt has not started yet, she will call endocrinologist today with recent readings.   [facility] appt scheduled for ultrasound

## 2019-12-03 ENCOUNTER — HOSPITAL ENCOUNTER (OUTPATIENT)
Dept: PERINATAL CARE | Age: 34
Discharge: HOME | End: 2019-12-03
Attending: OBSTETRICS & GYNECOLOGY
Payer: SUBSIDIZED

## 2019-12-03 PROCEDURE — 76811 OB US DETAILED SNGL FETUS: CPT | Performed by: OBSTETRICS & GYNECOLOGY

## 2019-12-16 ENCOUNTER — ROUTINE PRENATAL (OUTPATIENT)
Dept: OBGYN CLINIC | Age: 34
End: 2019-12-16

## 2019-12-16 VITALS — BODY MASS INDEX: 34.02 KG/M2 | WEIGHT: 186 LBS | SYSTOLIC BLOOD PRESSURE: 118 MMHG | DIASTOLIC BLOOD PRESSURE: 78 MMHG

## 2019-12-16 DIAGNOSIS — Z34.80 SUPERVISION OF OTHER NORMAL PREGNANCY: Primary | ICD-10-CM

## 2019-12-16 NOTE — PROGRESS NOTES
Pt doing well, see prenatal flowsheet. tdap at next visit. Pt reports that she has not seen the endocrinologist recently since she does not want to go on Insulin. She reports that she has changed her diet completely. She was very happy with her glucose log results today. Fasting BS ranged from 100-100. Postprandial BS were 120-160's. I discussed with the pt that her BS were still sub-optimal.  We discussed that even with perfect eating she may not be able to control her BS without insulin. I recommended that she start insulin. She reports that she is not willing to start yet as she wants to work on her diet more first.  She has an appt with [address] [place] [address] in 2 weeks and agreed to make a follow-up appointment with endocrine that week as well.

## 2020-01-02 ENCOUNTER — HOSPITAL ENCOUNTER (OUTPATIENT)
Dept: PERINATAL CARE | Age: 35
Discharge: HOME | End: 2020-01-02
Attending: OBSTETRICS & GYNECOLOGY
Payer: SUBSIDIZED

## 2020-01-02 PROCEDURE — 76816 OB US FOLLOW-UP PER FETUS: CPT | Performed by: OBSTETRICS & GYNECOLOGY

## 2020-01-13 ENCOUNTER — ROUTINE PRENATAL (OUTPATIENT)
Dept: OBGYN CLINIC | Age: 35
End: 2020-01-13

## 2020-01-13 VITALS — BODY MASS INDEX: 33.84 KG/M2 | SYSTOLIC BLOOD PRESSURE: 112 MMHG | DIASTOLIC BLOOD PRESSURE: 76 MMHG | WEIGHT: 185 LBS

## 2020-01-13 DIAGNOSIS — Z23 ENCOUNTER FOR IMMUNIZATION: ICD-10-CM

## 2020-01-13 DIAGNOSIS — Z34.80 SUPERVISION OF OTHER NORMAL PREGNANCY: Primary | ICD-10-CM

## 2020-01-13 NOTE — PROGRESS NOTES
TDAP 0.5 mL administered intramuscularly in the right arm per Dr. Adia Nolan with verbal consent received from patient and two patient identifiers used. Patient tolerated well with no reactions observed for ten minutes post injection.    Lot # [id number]  Exp- 4/2/22

## 2020-01-14 LAB
ERYTHROCYTE [DISTWIDTH] IN BLOOD BY AUTOMATED COUNT: 13.1 % (ref 11.7–15.4)
HCT VFR BLD AUTO: 40.7 % (ref 34–46.6)
HGB BLD-MCNC: 13.4 G/DL (ref 11.1–15.9)
MCH RBC QN AUTO: 32.1 PG (ref 26.6–33)
MCHC RBC AUTO-ENTMCNC: 32.9 G/DL (ref 31.5–35.7)
MCV RBC AUTO: 97 FL (ref 79–97)
PLATELET # BLD AUTO: 243 X10E3/UL (ref 150–450)
RBC # BLD AUTO: 4.18 X10E6/UL (ref 3.77–5.28)
WBC # BLD AUTO: 10.4 X10E3/UL (ref 3.4–10.8)

## 2020-01-27 ENCOUNTER — ROUTINE PRENATAL (OUTPATIENT)
Dept: OBGYN CLINIC | Age: 35
End: 2020-01-27

## 2020-01-27 VITALS — DIASTOLIC BLOOD PRESSURE: 76 MMHG | BODY MASS INDEX: 34.57 KG/M2 | SYSTOLIC BLOOD PRESSURE: 114 MMHG | WEIGHT: 189 LBS

## 2020-01-27 DIAGNOSIS — Z34.80 SUPERVISION OF OTHER NORMAL PREGNANCY: Primary | ICD-10-CM

## 2020-01-30 ENCOUNTER — HOSPITAL ENCOUNTER (OUTPATIENT)
Dept: PERINATAL CARE | Age: 35
Discharge: HOME | End: 2020-01-30
Attending: OBSTETRICS & GYNECOLOGY
Payer: SUBSIDIZED

## 2020-01-30 PROCEDURE — 76816 OB US FOLLOW-UP PER FETUS: CPT | Performed by: OBSTETRICS & GYNECOLOGY

## 2020-02-06 ENCOUNTER — HOSPITAL ENCOUNTER (OUTPATIENT)
Dept: PERINATAL CARE | Age: 35
Discharge: HOME | End: 2020-02-06
Attending: OBSTETRICS & GYNECOLOGY
Payer: MEDICAID

## 2020-02-06 PROCEDURE — 76819 FETAL BIOPHYS PROFIL W/O NST: CPT | Performed by: OBSTETRICS & GYNECOLOGY

## 2020-02-10 ENCOUNTER — ROUTINE PRENATAL (OUTPATIENT)
Dept: OBGYN CLINIC | Age: 35
End: 2020-02-10

## 2020-02-10 VITALS — BODY MASS INDEX: 34.75 KG/M2 | SYSTOLIC BLOOD PRESSURE: 122 MMHG | WEIGHT: 190 LBS | DIASTOLIC BLOOD PRESSURE: 84 MMHG

## 2020-02-10 DIAGNOSIS — Z34.80 SUPERVISION OF OTHER NORMAL PREGNANCY: Primary | ICD-10-CM

## 2020-02-13 ENCOUNTER — HOSPITAL ENCOUNTER (OUTPATIENT)
Dept: PERINATAL CARE | Age: 35
Discharge: HOME | End: 2020-02-13
Attending: OBSTETRICS & GYNECOLOGY
Payer: MEDICAID

## 2020-02-13 PROCEDURE — 76819 FETAL BIOPHYS PROFIL W/O NST: CPT | Performed by: OBSTETRICS & GYNECOLOGY

## 2020-02-20 ENCOUNTER — HOSPITAL ENCOUNTER (OUTPATIENT)
Dept: PERINATAL CARE | Age: 35
Discharge: HOME | End: 2020-02-20
Attending: OBSTETRICS & GYNECOLOGY
Payer: MEDICAID

## 2020-02-20 PROCEDURE — 76819 FETAL BIOPHYS PROFIL W/O NST: CPT | Performed by: OBSTETRICS & GYNECOLOGY

## 2020-02-24 ENCOUNTER — ROUTINE PRENATAL (OUTPATIENT)
Dept: OBGYN CLINIC | Age: 35
End: 2020-02-24

## 2020-02-24 VITALS — DIASTOLIC BLOOD PRESSURE: 79 MMHG | BODY MASS INDEX: 35.12 KG/M2 | WEIGHT: 192 LBS | SYSTOLIC BLOOD PRESSURE: 128 MMHG

## 2020-02-24 DIAGNOSIS — Z34.80 SUPERVISION OF OTHER NORMAL PREGNANCY: Primary | ICD-10-CM

## 2020-02-24 RX ORDER — METFORMIN HYDROCHLORIDE 500 MG/1
TABLET ORAL
COMMUNITY
Start: 2020-01-30

## 2020-02-27 ENCOUNTER — HOSPITAL ENCOUNTER (OUTPATIENT)
Dept: PERINATAL CARE | Age: 35
Discharge: HOME | End: 2020-02-27
Attending: OBSTETRICS & GYNECOLOGY
Payer: MEDICAID

## 2020-02-27 PROCEDURE — 76816 OB US FOLLOW-UP PER FETUS: CPT | Performed by: OBSTETRICS & GYNECOLOGY

## 2020-02-27 PROCEDURE — 76819 FETAL BIOPHYS PROFIL W/O NST: CPT | Performed by: OBSTETRICS & GYNECOLOGY

## 2020-03-01 ENCOUNTER — HOSPITAL ENCOUNTER (EMERGENCY)
Age: 35
Discharge: HOME | End: 2020-03-01
Attending: EMERGENCY MEDICINE
Payer: MEDICAID

## 2020-03-01 VITALS
SYSTOLIC BLOOD PRESSURE: 121 MMHG | TEMPERATURE: 98.9 F | OXYGEN SATURATION: 97 % | DIASTOLIC BLOOD PRESSURE: 74 MMHG | RESPIRATION RATE: 20 BRPM | HEIGHT: 62 IN | BODY MASS INDEX: 35.88 KG/M2 | WEIGHT: 195 LBS

## 2020-03-01 DIAGNOSIS — J01.10 ACUTE NON-RECURRENT FRONTAL SINUSITIS: Primary | ICD-10-CM

## 2020-03-01 LAB
DEPRECATED S PYO AG THROAT QL EIA: NEGATIVE
FLUAV AG NPH QL IA: NEGATIVE
FLUBV AG NOSE QL IA: NEGATIVE

## 2020-03-01 PROCEDURE — 99282 EMERGENCY DEPT VISIT SF MDM: CPT

## 2020-03-01 PROCEDURE — 87880 STREP A ASSAY W/OPTIC: CPT

## 2020-03-01 PROCEDURE — 74011250637 HC RX REV CODE- 250/637: Performed by: EMERGENCY MEDICINE

## 2020-03-01 PROCEDURE — 87147 CULTURE TYPE IMMUNOLOGIC: CPT

## 2020-03-01 PROCEDURE — 87804 INFLUENZA ASSAY W/OPTIC: CPT

## 2020-03-01 PROCEDURE — 87070 CULTURE OTHR SPECIMN AEROBIC: CPT

## 2020-03-01 RX ORDER — ACETAMINOPHEN 500 MG
1000 TABLET ORAL
Status: COMPLETED | OUTPATIENT
Start: 2020-03-01 | End: 2020-03-01

## 2020-03-01 RX ORDER — FLUTICASONE PROPIONATE 50 MCG
2 SPRAY, SUSPENSION (ML) NASAL DAILY
Status: DISCONTINUED | OUTPATIENT
Start: 2020-03-01 | End: 2020-03-01 | Stop reason: HOSPADM

## 2020-03-01 RX ORDER — FLUTICASONE PROPIONATE 50 MCG
2 SPRAY, SUSPENSION (ML) NASAL DAILY
Qty: 1 BOTTLE | Refills: 0 | Status: SHIPPED | OUTPATIENT
Start: 2020-03-01

## 2020-03-01 RX ORDER — AMOXICILLIN AND CLAVULANATE POTASSIUM 875; 125 MG/1; MG/1
1 TABLET, FILM COATED ORAL 2 TIMES DAILY
Qty: 14 TAB | Refills: 0 | Status: SHIPPED | OUTPATIENT
Start: 2020-03-01 | End: 2020-03-08

## 2020-03-01 RX ORDER — ACETAMINOPHEN 500 MG
1000 TABLET ORAL
Qty: 30 TAB | Refills: 0 | Status: SHIPPED | OUTPATIENT
Start: 2020-03-01

## 2020-03-01 RX ADMIN — FLUTICASONE PROPIONATE 2 SPRAY: 50 SPRAY, METERED NASAL at 09:51

## 2020-03-01 RX ADMIN — ACETAMINOPHEN 1000 MG: 500 TABLET ORAL at 09:30

## 2020-03-01 NOTE — DISCHARGE INSTRUCTIONS
Patient Education        Sinusitis: Instrucciones de cuidado - [ Sinusitis: Care Instructions ]  Instrucciones de cuidado    La sinusitis es deandre infección del recubrimiento de las cavidades de los senos paranasales de la tiki. La sinusitis con frecuencia se presenta después de un resfriado. Causa dolor y presión en la tiki y la zenon. En la [employer], la sinusitis mejora kiet en 1 o 2 semanas. Compa algunos síntomas leves pueden durar varias semanas. A veces se necesitan antibióticos. La atención de seguimiento es deandre parte clave de armstrong tratamiento y seguridad. Asegúrese de hacer y acudir a todas las citas, y llame a armstrong médico si está teniendo problemas. También es deandre buena idea saber los resultados de luh exámenes y mantener deandre lista de los medicamentos que jatin. ¿Cómo puede cuidarse en el hogar? · Mission un analgésico (medicamento para el dolor) de venta guillermo, anselmo acetaminofén (Tylenol), ibuprofeno (Advil, Motrin) o naproxeno (Aleve). Graciela y siga todas las instrucciones de la [place]. · Si el médico le recetó antibióticos, tómelos anselmo se los indicó. No deje de tomarlos por el hecho de sentirse mejor. Debe gilbert todos los antibióticos hasta terminarlos. · Tenga cuidado cuando tome medicamentos de venta guillermo para el resfriado común o la gripe y Tylenol al [employer]. Muchos de estos medicamentos contienen acetaminofén, o sea, Tylenol. Graciela las etiquetas para asegurarse de que no está tomando deandre dosis mayor que la recomendada. El exceso de acetaminofén (Tylenol) puede ser dañino. · Respire aire caliente y húmedo de [place] con vapor, un baño caliente o un lavamanos lleno de [place]. Evite el aire frío y seco. Usar un humidificador en armstrong casa podría ayudar. Siga las indicaciones para limpiar el aparato. · Use lavados nasales con solución salina (agua salada) para ayudar a mantener las fosas nasales despejadas y eliminar la mucosidad y las bacterias.  Puede comprar gotas nasales de solución salina en un supermercado o deandre farmacia. O puede prepararlas usted mismo en casa agregándole 1 cucharadita de sal y 1 cucharadita de bicarbonato de sodio a 2 tazas de agua destilada. Si las prepara usted mismo, llene deandre all de goma con la solución, introduzca la punta en la fosa nasal y apriete con suavidad. Suénese la nariz. · Colóquese deandre toalla caliente y húmeda o deandre almohadilla tibia de gel en la zenon 3 o 4 veces al día por entre 5 y 8 minutos [id number] [address] [place]. · Pruebe con un descongestionante nasal en aerosol, anslemo oximetazolina (Afrin). No lo use por más de 3 días seguidos. Usarlo por más de 3 días puede empeorar la congestión. ¿Cuándo debe pedir ayuda? Llame a armstrong médico ahora mismo o busque atención médica inmediata si:    · Tiene hinchazón o enrojecimiento nuevos o peores en la zenon o alrededor de los ojos.     · Tiene fiebre nueva o más kristen.    Preste especial atención a los cambios en armstrong livier y asegúrese de comunicarse con armstrong médico si:    · Tiene dolor facial nuevo o que empeora.     · El moco de la nariz se vuelve más espeso (anselmo pus) o contiene torrey nueva.     · No mejora anselmo se esperaba. ¿Dónde puede encontrar más información en inglés? Javon Baldwin a http://colleen.info/. Conrad Rodriguez T504 en la búsqueda para aprender más acerca de \"Sinusitis: Instrucciones de cuidado - [ Sinusitis: Care Instructions ]. \"  Revisado: 21 octubre, 2018  Versión del contenido: 12.2  © 7875-7096 [employer]. Las instrucciones de cuidado fueron adaptadas bajo licencia por [employer] (which disclaims liability or warranty for this information). Si usted tiene [place] afección médica o sobre estas instrucciones, siempre pregunte a armstrong profesional de livier. [employer] niega toda garantía o responsabilidad por armstrong uso de esta información.

## 2020-03-01 NOTE — ED PROVIDER NOTES
41-year-old female at approximately 35 weeks gestation presents with sore throat, cough, chest pain, runny nose. Cough is productive of sputum in the morning. Also complains of sinus pain and headache. Symptoms have been ongoing for over a week. She denies any fevers. She states that her chest and throat hurt worse when she coughs. She has been having [place] contractions for the past week that have been unchanged. Sore Throat    Associated symptoms include cough. Pertinent negatives include no ear pain, no headaches and no shortness of breath. Cough   Associated symptoms include sore throat. Pertinent negatives include no chest pain, no chills, no ear pain, no headaches and no shortness of breath. Past Medical History:   Diagnosis Date    Gestational diabetes mellitus (GDM)        No past surgical history on file.       Family History:   Problem Relation Age of Onset    Hypertension Mother     Diabetes Mother        Social History     Socioeconomic History    Marital status:      Spouse name: Not on file    Number of children: Not on file    Years of education: Not on file    Highest education level: Not on file   Occupational History    Not on file   Social Needs    Financial resource strain: Not on file    Food insecurity:     Worry: Not on file     Inability: Not on file    Transportation needs:     Medical: Not on file     Non-medical: Not on file   Tobacco Use    Smoking status: Never Smoker    Smokeless tobacco: Never Used   Substance and Sexual Activity    Alcohol use: Never     Frequency: Never    Drug use: Never    Sexual activity: Yes     Partners: Male     Birth control/protection: None   Lifestyle    Physical activity:     Days per week: Not on file     Minutes per session: Not on file    Stress: Not on file   Relationships    Social connections:     Talks on phone: Not on file     Gets together: Not on file     Attends Mandaeism service: Not on file Active member of club or organization: Not on file     Attends meetings of clubs or organizations: Not on file     Relationship status: Not on file    Intimate partner violence:     Fear of current or ex partner: Not on file     Emotionally abused: Not on file     Physically abused: Not on file     Forced sexual activity: Not on file   Other Topics Concern    Not on file   Social History Narrative    Not on file         ALLERGIES: Patient has no known allergies. Review of Systems   Constitutional: Negative for chills and fever. HENT: Positive for sore throat. Negative for ear pain. Eyes: Negative for pain. Respiratory: Positive for cough. Negative for chest tightness and shortness of breath. Cardiovascular: Negative for chest pain. Gastrointestinal: Negative for abdominal pain. Genitourinary: Negative for flank pain. Musculoskeletal: Negative for back pain. Skin: Negative for rash. Neurological: Negative for headaches. All other systems reviewed and are negative. Vitals:    03/01/20 0858 03/01/20 0930 03/01/20 0944 03/01/20 0945   BP: 136/72 120/81  121/74   Resp: 20      Temp: 98.8 °F (37.1 °C) 98.9 °F (37.2 °C)     SpO2: 96% 95% 97%    Weight: 88.5 kg (195 lb)      Height: 5' 2\" (1.575 m)               Physical Exam  Vitals signs and nursing note reviewed. Constitutional:       General: She is not in acute distress. HENT:      Head: Normocephalic and atraumatic. Nose: Congestion and rhinorrhea present. Mouth/Throat:      Mouth: Mucous membranes are moist.   Eyes:      General: No scleral icterus. Conjunctiva/sclera: Conjunctivae normal.      Pupils: Pupils are equal, round, and reactive to light. Neck:      Musculoskeletal: Neck supple. Trachea: No tracheal deviation. Cardiovascular:      Rate and Rhythm: Normal rate and regular rhythm. Heart sounds: Normal heart sounds. Pulmonary:      Effort: Pulmonary effort is normal. No respiratory distress. Breath sounds: No wheezing or rales. Abdominal:      General: There is no distension. Palpations: Abdomen is soft. Tenderness: There is no abdominal tenderness. Comments: Gravid uterus   Genitourinary:     Comments: deferred  Musculoskeletal:         General: No deformity. Skin:     General: Skin is warm and dry. Neurological:      General: No focal deficit present. Mental Status: She is alert. Psychiatric:         Mood and Affect: Mood normal.          MDM       Procedures    10:12 AM  Patient re-evaluated. All questions answered. Patient appropriate for discharge. Given return precautions and follow up instructions. LABORATORY TESTS:  Labs Reviewed   STREP AG SCREEN, GROUP A   CULTURE, THROAT   INFLUENZA A+B VIRAL AGS       IMAGING RESULTS:  No orders to display       MEDICATIONS GIVEN:  Medications   fluticasone propionate (FLONASE) 50 mcg/actuation nasal spray 2 Spray (2 Sprays Both Nostrils Given 3/1/20 0951)   acetaminophen (TYLENOL) tablet 1,000 mg (1,000 mg Oral Given 3/1/20 0930)       IMPRESSION:  1. Acute non-recurrent frontal sinusitis        PLAN:  1. Discharge Medication List as of 3/1/2020 10:14 AM      START taking these medications    Details   fluticasone propionate (FLONASE) 50 mcg/actuation nasal spray 2 Sprays by Both Nostrils route daily. , Print, Disp-1 Bottle, R-0      acetaminophen (TYLENOL EXTRA STRENGTH) 500 mg tablet Take 2 Tabs by mouth every six (6) hours as needed for Pain or Fever., Print, Disp-30 Tab, R-0      amoxicillin-clavulanate (AUGMENTIN) 875-125 mg per tablet Take 1 Tab by mouth two (2) times a day for 7 days. , Print, Disp-14 Tab, R-0         CONTINUE these medications which have NOT CHANGED    Details   metFORMIN (GLUCOPHAGE) 500 mg tablet TAKE 1 TABLET BY MOUTH EVERY DAY AT BEDTIME MAY INCREASE TO 2 TABLETS AT BEDTIME IF FASTING SUGARS REMAIN GREATER THAN OR EQUAL TO 100MG DL, Historical Med      PNV KU.93/[id number] fum/folic ac (PRENATAL PO) Take  by mouth., Historical Med      insulin NPH (NOVOLIN N, HUMULIN N) 100 unit/mL injection Inject 16 units at bedtime., Normal, Disp-1 Vial, R-6      insulin syringe-needle U-100 (BD INSULIN SYRINGE ULTRA-FINE) 1 mL 31 gauge x 15/64\" syrg Use to inject insulin once daily. Dx. Code E11.65, Normal, Disp-50 Pen Needle, R-6           2. Follow-up Information     Follow up With Specialties Details Why [facility]  Schedule an appointment as soon as possible for a visit or your PCP [address] [place] [id number] [phone]    [facility] EMERGENCY DEPT Emergency Medicine  If symptoms worsen or new concerns [address] [facility]  [phone]        3. Return to ED for new or worsening symptoms       Saravanan Bhandari MD

## 2020-03-01 NOTE — ED TRIAGE NOTES
Pt is 35 weeks pregnant here with cough, congestion, and sore throat. Pt denies any fever. + [place] contractions x 1 week that have been unchanged since becoming ill 3 days ago.

## 2020-03-03 LAB
BACTERIA SPEC CULT: ABNORMAL
BACTERIA SPEC CULT: ABNORMAL
SERVICE CMNT-IMP: ABNORMAL

## 2020-03-05 ENCOUNTER — HOSPITAL ENCOUNTER (OUTPATIENT)
Dept: PERINATAL CARE | Age: 35
Discharge: HOME | End: 2020-03-05
Attending: OBSTETRICS & GYNECOLOGY
Payer: MEDICAID

## 2020-03-05 PROCEDURE — 76819 FETAL BIOPHYS PROFIL W/O NST: CPT | Performed by: OBSTETRICS & GYNECOLOGY

## 2020-03-09 ENCOUNTER — ROUTINE PRENATAL (OUTPATIENT)
Dept: OBGYN CLINIC | Age: 35
End: 2020-03-09

## 2020-03-09 VITALS — BODY MASS INDEX: 34.93 KG/M2 | SYSTOLIC BLOOD PRESSURE: 130 MMHG | DIASTOLIC BLOOD PRESSURE: 84 MMHG | WEIGHT: 191 LBS

## 2020-03-09 DIAGNOSIS — Z34.80 SUPERVISION OF OTHER NORMAL PREGNANCY: Primary | ICD-10-CM

## 2020-03-09 LAB — GRBS, EXTERNAL: POSITIVE

## 2020-03-10 ENCOUNTER — HOSPITAL ENCOUNTER (EMERGENCY)
Age: 35
Discharge: HOME | End: 2020-03-10
Attending: OBSTETRICS & GYNECOLOGY | Admitting: OBSTETRICS & GYNECOLOGY
Payer: MEDICAID

## 2020-03-10 VITALS
HEIGHT: 60 IN | HEART RATE: 70 BPM | RESPIRATION RATE: 16 BRPM | TEMPERATURE: 98.4 F | OXYGEN SATURATION: 97 % | BODY MASS INDEX: 37.5 KG/M2 | SYSTOLIC BLOOD PRESSURE: 135 MMHG | WEIGHT: 191 LBS | DIASTOLIC BLOOD PRESSURE: 77 MMHG

## 2020-03-10 PROCEDURE — 74011250637 HC RX REV CODE- 250/637: Performed by: ADVANCED PRACTICE MIDWIFE

## 2020-03-10 PROCEDURE — 59025 FETAL NON-STRESS TEST: CPT

## 2020-03-10 PROCEDURE — 75810000275 HC EMERGENCY DEPT VISIT NO LEVEL OF CARE

## 2020-03-10 RX ORDER — ZOLPIDEM TARTRATE 5 MG/1
5 TABLET ORAL
Status: COMPLETED | OUTPATIENT
Start: 2020-03-10 | End: 2020-03-10

## 2020-03-10 RX ADMIN — ZOLPIDEM TARTRATE 5 MG: 5 TABLET ORAL at 23:42

## 2020-03-11 LAB — GP B STREP DNA SPEC QL NAA+PROBE: POSITIVE

## 2020-03-11 NOTE — DISCHARGE INSTRUCTIONS
Patient Education   Patient Education   Patient Education        [place] Contractions: Care Instructions  Your Care Instructions    [place] contractions prepare your uterus for labor. Think of them as a \"warm-up\" exercise that your body does. You may begin to feel them between the 28th and 30th weeks of your pregnancy. But they start as early as the 20th week. [place] contractions usually occur more often during the ninth month. They may go away when you are active and return when you rest. These contractions are like mild contractions of true labor, but they occur less often. (You feel fewer than 8 in an hour.) They don't cause your cervix to open. It may be hard for you to tell the difference between [place] contractions and true labor, especially in your first pregnancy. Follow-up care is a key part of your treatment and safety. Be sure to make and go to all appointments, and call your doctor if you are having problems. It's also a good idea to know your test results and keep a list of the medicines you take. How can you care for yourself at home? · Try a warm bath to help relieve muscle tension and reduce pain. · Change positions every 30 minutes. Take breaks if you must sit for a long time. Get up and walk around. · Drink plenty of water, enough so that your urine is light yellow or clear like water. · Taking short walks may help you feel better. Your doctor needs to check any contractions that are getting stronger or closer together. Where can you learn more? Go to http://www.gray.com/. Enter [id number] in the search box to learn more about \"[place] Contractions: Care Instructions. \"  Current as of: May 29, 2019  Content Version: 12.2  © 1986-3199 [employer]. Care instructions adapted under license by [employer] (which disclaims liability or warranty for this information).  If you have questions about a medical condition or this instruction, always ask your healthcare professional. [place] [address] any warranty or liability for your use of this information. Week 37 of Your Pregnancy: Care Instructions  Your Care Instructions    You are near the end of your pregnancy--and you're probably pretty uncomfortable. It may be harder to walk around. Lying down probably isn't comfortable either. You may have trouble getting to sleep or staying asleep. Most women deliver their babies between 40 and 41 weeks. This is a good time to think about packing a bag for the hospital with items you'll need. Then you'll be ready when labor starts. Follow-up care is a key part of your treatment and safety. Be sure to make and go to all appointments, and call your doctor if you are having problems. It's also a good idea to know your test results and keep a list of the medicines you take. How can you care for yourself at home? Learn about breastfeeding  · Breastfeeding is best for your baby and good for you. · Breast milk has antibodies to help your baby fight infections. · Mothers who breastfeed often lose weight faster, because making milk burns calories. · Learning the best ways to hold your baby will make breastfeeding easier. · Let your partner bathe and diaper the baby to keep your partner from feeling left out. Snuggle together when you breastfeed. · You may want to learn how to use a breast pump and store your milk. · If you choose to bottle feed, make the feeding feel like breastfeeding so you can bond with your baby. Always hold your baby and the bottle. Do not prop bottles or let your baby fall asleep with a bottle. Learn about crying  · It is common for babies to cry for 1 to 3 hours a day. Some cry more, some cry less. · Babies don't cry to make you upset or because you are a bad parent. · Crying is how your baby communicates.  Your baby may be hungry; have gas; need a diaper change; or feel cold, warm, tired, lonely, or tense. Sometimes babies cry for unknown reasons. · If you respond to your baby's needs, he or she will learn to trust you. · Try to stay calm when your baby cries. Your baby may get more upset if he or she senses that you are upset. Know how to care for your   · Your baby's umbilical cord stump will drop off on its own, usually between 1 and 2 weeks. To care for your baby's umbilical cord area:  ? Clean the area at the bottom of the cord 2 or 3 times a day. ? Pay special attention to the area where the cord attaches to the skin. ? Keep the diaper folded below the cord. ? Use a damp washcloth or cotton ball to sponge bathe your baby until the stump has come off. · Your baby's first dark stool is called meconium. After the meconium is passed, your baby will develop his or her own bowel pattern. ? Some babies, especially  babies, have several bowel movements a day. Others have one or two a day, or one every 2 to 3 days. ?  babies often have loose, yellow stools. Formula-fed babies have more formed stools. ? If your baby's stools look like little pellets, he or she is constipated. After 2 days of constipation, call your baby's doctor. · If your baby will be circumcised, you can care for him at home. ? Gently rinse his penis with warm water after every diaper change. Do not try to remove the film that forms on the penis. This film will go away on its own. Pat dry. ? Put petroleum ointment, such as Vaseline, on the area of the diaper that will touch your baby's penis. This will keep the diaper from sticking to your baby. ? Ask the doctor about giving your baby acetaminophen (Tylenol) for pain. Where can you learn more? Go to http://melvin-alli.info/. Enter [phone]  [id number] in the search box to learn more about \"Week 37 of Your Pregnancy: Care Instructions. \"  Current as of: May 29, 2019  Content Version: 12.2  © 8115-2495 [employer], [facility].  Care instructions adapted under license by [employer] (which disclaims liability or warranty for this information). If you have questions about a medical condition or this instruction, always ask your healthcare professional. [address] any warranty or liability for your use of this information. Pregnancy Precautions: Care Instructions  Your Care Instructions    There is no sure way to prevent labor before your due date ( labor) or to prevent most other pregnancy problems. But there are things you can do to increase your chances of a healthy pregnancy. Go to your appointments, follow your doctor's advice, and take good care of yourself. Eat well, and exercise (if your doctor agrees). And make sure to drink plenty of water. Follow-up care is a key part of your treatment and safety. Be sure to make and go to all appointments, and call your doctor if you are having problems. It's also a good idea to know your test results and keep a list of the medicines you take. How can you care for yourself at home? · Make sure you go to your prenatal appointments. At each visit, your doctor will check your blood pressure. Your doctor will also check to see if you have protein in your urine. High blood pressure and protein in urine are signs of preeclampsia. This condition can be dangerous for you and your baby. · Drink plenty of fluids, enough so that your urine is light yellow or clear like water. Dehydration can cause contractions. If you have kidney, heart, or liver disease and have to limit fluids, talk with your doctor before you increase the amount of fluids you drink. · Tell your doctor right away if you notice any symptoms of an infection, such as:  ? Burning when you urinate. ? A foul-smelling discharge from your vagina. ? Vaginal itching. ? Unexplained fever. ? Unusual pain or soreness in your uterus or lower belly. · Eat a balanced diet.  Include plenty of foods that are high in calcium and iron. ? Foods high in calcium include milk, cheese, yogurt, almonds, and broccoli. ? Foods high in iron include red meat, shellfish, poultry, eggs, beans, raisins, whole-grain bread, and leafy green vegetables. · Do not smoke. If you need help quitting, talk to your doctor about stop-smoking programs and medicines. These can increase your chances of quitting for good. · Do not drink alcohol or use illegal drugs. · Follow your doctor's directions about activity. Your doctor will let you know how much, if any, exercise you can do. · Ask your doctor if you can have sex. If you are at risk for early labor, your doctor may ask you to not have sex. · Take care to prevent falls. During pregnancy, your joints are loose, and your balance is off. Sports such as bicycling, skiing, or in-line skating can increase your risk of falling. And don't ride horses or motorcycles, dive, water ski, scuba dive, or parachute jump while you are pregnant. · Avoid getting very hot. Do not use saunas or hot tubs. Avoid staying out in the sun in hot weather for long periods. Take acetaminophen (Tylenol) to lower a high fever. · Do not take any over-the-counter or herbal medicines or supplements without talking to your doctor or pharmacist first.  When should you call for help? Call 911 anytime you think you may need emergency care. For example, call if:    · You passed out (lost consciousness).     · You have a seizure.     · You have severe vaginal bleeding.     · You have severe pain in your belly or pelvis.     · You have had fluid gushing or leaking from your vagina and you know or think the umbilical cord is bulging into your vagina. If this happens, immediately get down on your knees so your rear end (buttocks) is higher than your head.  This will decrease the pressure on the cord until help arrives.   [facility] your doctor now or seek immediate medical care if:    · You have signs of preeclampsia, such as:  ? Sudden swelling of your face, hands, or feet. ? New vision problems (such as dimness, blurring, or seeing spots). ? A severe headache.     · You have any vaginal bleeding.     · You have belly pain or cramping.     · You have a fever.     · You have had regular contractions (with or without pain) for an hour. This means that you have 8 or more within 1 hour or 4 or more in 20 minutes after you change your position and drink fluids.     · You have a sudden release of fluid from your vagina.     · You have low back pain or pelvic pressure that does not go away.     · You notice that your baby has stopped moving or is moving much less than normal.    Watch closely for changes in your health, and be sure to contact your doctor if you have any problems. Where can you learn more? Go to http://melvin-alli.info/. Enter [id number] in the search box to learn more about \"Pregnancy Precautions: Care Instructions. \"  Current as of: May 29, 2019  Content Version: 12.2  © 3726-8136 [employer]. Care instructions adapted under license by [employer] (which disclaims liability or warranty for this information). If you have questions about a medical condition or this instruction, always ask your healthcare professional. [address] any warranty or liability for your use of this information.

## 2020-03-11 NOTE — PROGRESS NOTES
2230: Patient arrived from ED via wheelchair - states that she has been venkat for a week, but tonight they became more frequent. States that she has some pressure in vagina and has had a hard time sleeping. Was seen by Yair Emmanuel MD yesterday in office for prenatal appointment and SVE was 3 cm. Denies leaking of fluid, bleeding, has been feeling baby move frequently. Denies urinary symptoms. Patient does report GDM, polyhydramnios and transverse presentation of fetus at last appt with indux scheduled 3/20.   2240: SVE preformed by RN - 2-3/50/-2.   2300:  Spoke with Reyes Barge via telephone, assessment reviewed, TORB with reactive NST patient may be discharged home to self care, patient may also stay and wait 2 hours if desired and recheck SVE. Patient may also have 5 mg Ambien at d/c.   2310: RN at bedside - plan reviewed with patient she she is agreeable to watching for reactive NST and discharge home with ambien. Will continue to monitor for now. 2340: NST reactive, RN at bedside for discharge teaching. Discharge instructions reviewed and all questions answered. Patient verbalized understanding for self care at home. 2355: Patient ambulated off unit with family at this time.

## 2020-03-12 ENCOUNTER — HOSPITAL ENCOUNTER (OUTPATIENT)
Dept: PERINATAL CARE | Age: 35
Discharge: HOME | End: 2020-03-12
Attending: OBSTETRICS & GYNECOLOGY
Payer: MEDICAID

## 2020-03-12 PROCEDURE — 76819 FETAL BIOPHYS PROFIL W/O NST: CPT | Performed by: OBSTETRICS & GYNECOLOGY

## 2020-03-13 ENCOUNTER — PATIENT MESSAGE (OUTPATIENT)
Dept: OBGYN CLINIC | Age: 35
End: 2020-03-13

## 2020-03-16 ENCOUNTER — ROUTINE PRENATAL (OUTPATIENT)
Dept: OBGYN CLINIC | Age: 35
End: 2020-03-16

## 2020-03-16 VITALS
HEIGHT: 60 IN | SYSTOLIC BLOOD PRESSURE: 132 MMHG | DIASTOLIC BLOOD PRESSURE: 74 MMHG | WEIGHT: 194 LBS | BODY MASS INDEX: 38.09 KG/M2

## 2020-03-16 DIAGNOSIS — Z34.80 SUPERVISION OF OTHER NORMAL PREGNANCY: Primary | ICD-10-CM

## 2020-03-16 NOTE — PATIENT INSTRUCTIONS

## 2020-03-17 ENCOUNTER — HOSPITAL ENCOUNTER (EMERGENCY)
Age: 35
Discharge: HOME | DRG: 540 | End: 2020-03-17
Attending: OBSTETRICS & GYNECOLOGY | Admitting: OBSTETRICS & GYNECOLOGY
Payer: MEDICAID

## 2020-03-17 ENCOUNTER — ANESTHESIA EVENT (OUTPATIENT)
Dept: LABOR AND DELIVERY | Age: 35
DRG: 540 | End: 2020-03-17
Payer: MEDICAID

## 2020-03-17 ENCOUNTER — ANESTHESIA (OUTPATIENT)
Dept: LABOR AND DELIVERY | Age: 35
DRG: 540 | End: 2020-03-17
Payer: MEDICAID

## 2020-03-17 ENCOUNTER — HOSPITAL ENCOUNTER (INPATIENT)
Age: 35
LOS: 3 days | Discharge: HOME | DRG: 540 | End: 2020-03-20
Attending: ADVANCED PRACTICE MIDWIFE | Admitting: ADVANCED PRACTICE MIDWIFE
Payer: MEDICAID

## 2020-03-17 VITALS
SYSTOLIC BLOOD PRESSURE: 133 MMHG | HEART RATE: 82 BPM | TEMPERATURE: 98.3 F | WEIGHT: 194 LBS | RESPIRATION RATE: 16 BRPM | BODY MASS INDEX: 35.7 KG/M2 | HEIGHT: 62 IN | DIASTOLIC BLOOD PRESSURE: 87 MMHG

## 2020-03-17 DIAGNOSIS — G89.18 POSTOPERATIVE PAIN: Primary | ICD-10-CM

## 2020-03-17 PROBLEM — Z34.90 PREGNANCY: Status: ACTIVE | Noted: 2020-03-17

## 2020-03-17 LAB
BASOPHILS # BLD: 0 K/UL (ref 0–0.1)
BASOPHILS NFR BLD: 0 % (ref 0–1)
DIFFERENTIAL METHOD BLD: ABNORMAL
EOSINOPHIL # BLD: 0.1 K/UL (ref 0–0.4)
EOSINOPHIL NFR BLD: 0 % (ref 0–7)
ERYTHROCYTE [DISTWIDTH] IN BLOOD BY AUTOMATED COUNT: 13.2 % (ref 11.5–14.5)
GLUCOSE BLD STRIP.AUTO-MCNC: 110 MG/DL (ref 65–100)
HCT VFR BLD AUTO: 39.7 % (ref 35–47)
HGB BLD-MCNC: 13.8 G/DL (ref 11.5–16)
IMM GRANULOCYTES # BLD AUTO: 0.1 K/UL (ref 0–0.04)
IMM GRANULOCYTES NFR BLD AUTO: 1 % (ref 0–0.5)
LYMPHOCYTES # BLD: 2.2 K/UL (ref 0.8–3.5)
LYMPHOCYTES NFR BLD: 14 % (ref 12–49)
MCH RBC QN AUTO: 32.5 PG (ref 26–34)
MCHC RBC AUTO-ENTMCNC: 34.8 G/DL (ref 30–36.5)
MCV RBC AUTO: 93.4 FL (ref 80–99)
MONOCYTES # BLD: 0.7 K/UL (ref 0–1)
MONOCYTES NFR BLD: 4 % (ref 5–13)
NEUTS SEG # BLD: 12.4 K/UL (ref 1.8–8)
NEUTS SEG NFR BLD: 81 % (ref 32–75)
NRBC # BLD: 0 K/UL (ref 0–0.01)
NRBC BLD-RTO: 0 PER 100 WBC
PLATELET # BLD AUTO: 239 K/UL (ref 150–400)
PMV BLD AUTO: 11.3 FL (ref 8.9–12.9)
RBC # BLD AUTO: 4.25 M/UL (ref 3.8–5.2)
SERVICE CMNT-IMP: ABNORMAL
WBC # BLD AUTO: 15.4 K/UL (ref 3.6–11)

## 2020-03-17 PROCEDURE — 82962 GLUCOSE BLOOD TEST: CPT

## 2020-03-17 PROCEDURE — 74011000250 HC RX REV CODE- 250: Performed by: ANESTHESIOLOGY

## 2020-03-17 PROCEDURE — 99283 EMERGENCY DEPT VISIT LOW MDM: CPT

## 2020-03-17 PROCEDURE — 74011250636 HC RX REV CODE- 250/636: Performed by: ADVANCED PRACTICE MIDWIFE

## 2020-03-17 PROCEDURE — 74011000258 HC RX REV CODE- 258: Performed by: ADVANCED PRACTICE MIDWIFE

## 2020-03-17 PROCEDURE — 75810000275 HC EMERGENCY DEPT VISIT NO LEVEL OF CARE

## 2020-03-17 PROCEDURE — 36415 COLL VENOUS BLD VENIPUNCTURE: CPT

## 2020-03-17 PROCEDURE — 76815 OB US LIMITED FETUS(S): CPT | Performed by: OBSTETRICS & GYNECOLOGY

## 2020-03-17 PROCEDURE — 59025 FETAL NON-STRESS TEST: CPT

## 2020-03-17 PROCEDURE — 85025 COMPLETE CBC W/AUTO DIFF WBC: CPT

## 2020-03-17 PROCEDURE — 65270000029 HC RM PRIVATE

## 2020-03-17 PROCEDURE — 77030014125 HC TY EPDRL BBMI -B: Performed by: ANESTHESIOLOGY

## 2020-03-17 PROCEDURE — 75410000002 HC LABOR FEE PER 1 HR: Performed by: OBSTETRICS & GYNECOLOGY

## 2020-03-17 RX ORDER — BUPIVACAINE HYDROCHLORIDE 2.5 MG/ML
INJECTION, SOLUTION EPIDURAL; INFILTRATION; INTRACAUDAL AS NEEDED
Status: DISCONTINUED | OUTPATIENT
Start: 2020-03-17 | End: 2020-03-18 | Stop reason: HOSPADM

## 2020-03-17 RX ORDER — SODIUM CHLORIDE 0.9 % (FLUSH) 0.9 %
5-40 SYRINGE (ML) INJECTION EVERY 8 HOURS
Status: DISCONTINUED | OUTPATIENT
Start: 2020-03-17 | End: 2020-03-18 | Stop reason: HOSPADM

## 2020-03-17 RX ORDER — PENICILLIN G POTASSIUM 5000000 [IU]/1
INJECTION, POWDER, FOR SOLUTION INTRAMUSCULAR; INTRAVENOUS
Status: DISPENSED
Start: 2020-03-17 | End: 2020-03-18

## 2020-03-17 RX ORDER — FENTANYL/BUPIVACAINE/NS/PF 2-1250MCG
1-16 PREFILLED PUMP RESERVOIR EPIDURAL CONTINUOUS
Status: DISCONTINUED | OUTPATIENT
Start: 2020-03-17 | End: 2020-03-20

## 2020-03-17 RX ORDER — EPHEDRINE SULFATE/0.9% NACL/PF 50 MG/5 ML
20 SYRINGE (ML) INTRAVENOUS
Status: DISCONTINUED | OUTPATIENT
Start: 2020-03-17 | End: 2020-03-18 | Stop reason: HOSPADM

## 2020-03-17 RX ORDER — SODIUM CHLORIDE 900 MG/100ML
INJECTION INTRAVENOUS
Status: DISPENSED
Start: 2020-03-17 | End: 2020-03-18

## 2020-03-17 RX ORDER — NALOXONE HYDROCHLORIDE 0.4 MG/ML
0.4 INJECTION, SOLUTION INTRAMUSCULAR; INTRAVENOUS; SUBCUTANEOUS AS NEEDED
Status: DISCONTINUED | OUTPATIENT
Start: 2020-03-17 | End: 2020-03-18 | Stop reason: HOSPADM

## 2020-03-17 RX ORDER — SODIUM CHLORIDE, SODIUM LACTATE, POTASSIUM CHLORIDE, CALCIUM CHLORIDE 600; 310; 30; 20 MG/100ML; MG/100ML; MG/100ML; MG/100ML
125 INJECTION, SOLUTION INTRAVENOUS CONTINUOUS
Status: DISCONTINUED | OUTPATIENT
Start: 2020-03-17 | End: 2020-03-18 | Stop reason: HOSPADM

## 2020-03-17 RX ORDER — SODIUM CHLORIDE 0.9 % (FLUSH) 0.9 %
5-40 SYRINGE (ML) INJECTION AS NEEDED
Status: DISCONTINUED | OUTPATIENT
Start: 2020-03-17 | End: 2020-03-18 | Stop reason: HOSPADM

## 2020-03-17 RX ORDER — LIDOCAINE HYDROCHLORIDE AND EPINEPHRINE 15; 5 MG/ML; UG/ML
INJECTION, SOLUTION EPIDURAL AS NEEDED
Status: DISCONTINUED | OUTPATIENT
Start: 2020-03-17 | End: 2020-03-18 | Stop reason: HOSPADM

## 2020-03-17 RX ADMIN — SODIUM CHLORIDE 5 MILLION UNITS: 900 INJECTION, SOLUTION INTRAVENOUS at 21:53

## 2020-03-17 RX ADMIN — SODIUM CHLORIDE, SODIUM LACTATE, POTASSIUM CHLORIDE, AND CALCIUM CHLORIDE 999 ML/HR: 600; 310; 30; 20 INJECTION, SOLUTION INTRAVENOUS at 21:51

## 2020-03-17 RX ADMIN — SODIUM CHLORIDE, SODIUM LACTATE, POTASSIUM CHLORIDE, AND CALCIUM CHLORIDE 125 ML/HR: 600; 310; 30; 20 INJECTION, SOLUTION INTRAVENOUS at 22:48

## 2020-03-17 RX ADMIN — BUPIVACAINE HYDROCHLORIDE 10 ML: 2.5 INJECTION, SOLUTION EPIDURAL; INFILTRATION; INTRACAUDAL; PERINEURAL at 22:38

## 2020-03-17 RX ADMIN — LIDOCAINE HYDROCHLORIDE AND EPINEPHRINE 3 ML: 15; 5 INJECTION, SOLUTION EPIDURAL at 22:39

## 2020-03-17 RX ADMIN — Medication 10 ML/HR: at 22:41

## 2020-03-17 NOTE — PROGRESS NOTES
1435-Pt in restroom changing into gown    1523-Dr. Maira Serrano on unit, states she checked pt cervix, 3.5 cm, gave pt option to walk for 2 hours and have cervix rechecked or go home. Pt opts to walk for 2 hours and reevaluate. 1527-Pt removed from EFM to ambulate in hallway    1615-Pt standing at side of bed, birthing ball offered, pt states she will use it, RN set up birthing ball at bedside, pt continues walking around room at this time    1735-VE 3.5/60/-2, no change from previous exam    1740-Report called to Dr. Maira Serrano, cervix unchanged, pt very uncomfortable, MD orders to explain to patient this is likely early labor as the cervix is not changing, go over precautions of when to come back to hospital for labor signs and symptoms. Pt may be discharged home at this time. 1743-RN at bedside explaining plan of care to discharge patient home because cervix is not changing. Pt asks what she can do for the pain. RN explains tylenol can be taken and position changes help also. Pt verbalizes understanding. 1807-Discussed discharge instructions with patient, call MD if contractions become more frequent or painful, or if leaking of fluid or gush of fluid occurs. Reviewed kick counts, and labor precautions, some vaginal bleeding/spotting is normal because of cervical exams. Pt verbalizes understanding and denies any questions.     1808-Pt ambulated off unit in stable condition with  at side

## 2020-03-17 NOTE — H&P
History & Physical    Name: Damon Medina MRN: [id number]  SSN: [id number]    YOB: 1985  Age: 37 y.o. Sex: female        Subjective:     Estimated Date of Delivery: 20  OB History        6    Para   4    Term   4            AB   1    Living           SAB   1    TAB        Ectopic        Molar        Multiple        Live Births                    Ms. Jessica Rod is admitted with pregnancy at 37w5d for painful uterine contractions. Prenatal course was complicated by  GBS POSITIVE  GDM- (likely pre-existing) seeing endo and PNC, on metformin  EFW 64% with AC accel at 35 weeks. Grandmultip  last pregnancy 7 years ago  AMA- declined genetic screening/testing. Please see prenatal records for details. Past Medical History:   Diagnosis Date    Gestational diabetes     Gestational diabetes mellitus (GDM)      No past surgical history on file. Social History     Occupational History    Not on file   Tobacco Use    Smoking status: Never Smoker    Smokeless tobacco: Never Used   Substance and Sexual Activity    Alcohol use: Never     Frequency: Never    Drug use: Never    Sexual activity: Yes     Partners: Male     Birth control/protection: None     Family History   Problem Relation Age of Onset    Hypertension Mother     Diabetes Mother        No Known Allergies  Prior to Admission medications    Medication Sig Start Date End Date Taking? Authorizing Provider   fluticasone propionate (FLONASE) 50 mcg/actuation nasal spray 2 Sprays by Both Nostrils route daily. 3/1/20  Yes Kenia Bardales MD   acetaminophen (TYLENOL EXTRA STRENGTH) 500 mg tablet Take 2 Tabs by mouth every six (6) hours as needed for Pain or Fever.  3/1/20  Yes Kenia Bardales MD   metFORMIN (GLUCOPHAGE) 500 mg tablet TAKE 1 TABLET BY MOUTH EVERY DAY AT BEDTIME MAY INCREASE TO 2 TABLETS AT BEDTIME IF FASTING SUGARS REMAIN GREATER THAN OR EQUAL TO 100MG DL 20  Yes Provider, Historical   PNV UU.98/VWGLZOE fum/folic ac (PRENATAL PO) Take  by mouth. Yes Provider, Historical   insulin NPH (NOVOLIN N, HUMULIN N) 100 unit/mL injection Inject 16 units at bedtime. 19   Siri Arevalo MD   insulin syringe-needle U-100 (BD INSULIN SYRINGE ULTRA-FINE) 1 mL 31 gauge x 15/64\" syrg Use to inject insulin once daily. Dx. Code E11.65 19   Siri Arevalo MD        Review of Systems: A comprehensive review of systems was negative except for that written in the HPI. Objective:     Vitals:  Vitals:    20 1444 20 1448   BP: 133/87    Pulse: 82    Resp: 16    Temp: 98.3 °F (36.8 °C)    Weight:  194 lb (88 kg)   Height:  5' 2\" (1.575 m)        Physical Exam:  Patient without distress. Cervical Exam: 3/60 %/-2/Mid  Membranes:  Intact  Fetal Heart Rate: Reactive, baseline 150, moderate variability, + accels, no decels, irreg contractions, monitoroed > 30  Minutes  [place]:  Irreg: q 2- 8 minutes      Prenatal Labs:   Lab Results   Component Value Date/Time    GrBStrep, External POSITIVE 2020        Assessment/Plan:     Plan:  at [place] [address] with painful contractions. No significant change since office visit however she is a grandmultip and lives approx 30 minutes away. Offered discharge to return if contractions worsen vs 2 hour rule out. Pt prefers to walk for 2 hours and then will be re-examined. If unchanged then will d/c to home. RFS. Group B Strep was positive, will treat prophylactically with penicillin if in labor.     Signed By:  Irma Reyes MD     2020

## 2020-03-17 NOTE — DISCHARGE INSTRUCTIONS
Patient Education   Patient Education   ***Call MD for contractions that become more frequent or more painful and are not getting better, leaking or gush of fluid (like your water is broken)***    ************Come to L&D Friday, 3/20/2020 for scheduled induction of labor @ 0500 in the morning*****************     Week 37 of Your Pregnancy: Care Instructions  Your Care Instructions    You are near the end of your pregnancy--and you're probably pretty uncomfortable. It may be harder to walk around. Lying down probably isn't comfortable either. You may have trouble getting to sleep or staying asleep. Most women deliver their babies between 40 and 41 weeks. This is a good time to think about packing a bag for the hospital with items you'll need. Then you'll be ready when labor starts. Follow-up care is a key part of your treatment and safety. Be sure to make and go to all appointments, and call your doctor if you are having problems. It's also a good idea to know your test results and keep a list of the medicines you take. How can you care for yourself at home? Learn about breastfeeding  · Breastfeeding is best for your baby and good for you. · Breast milk has antibodies to help your baby fight infections. · Mothers who breastfeed often lose weight faster, because making milk burns calories. · Learning the best ways to hold your baby will make breastfeeding easier. · Let your partner bathe and diaper the baby to keep your partner from feeling left out. Snuggle together when you breastfeed. · You may want to learn how to use a breast pump and store your milk. · If you choose to bottle feed, make the feeding feel like breastfeeding so you can bond with your baby. Always hold your baby and the bottle. Do not prop bottles or let your baby fall asleep with a bottle. Learn about crying  · It is common for babies to cry for 1 to 3 hours a day. Some cry more, some cry less.   · Babies don't cry to make you upset or because you are a bad parent. · Crying is how your baby communicates. Your baby may be hungry; have gas; need a diaper change; or feel cold, warm, tired, lonely, or tense. Sometimes babies cry for unknown reasons. · If you respond to your baby's needs, he or she will learn to trust you. · Try to stay calm when your baby cries. Your baby may get more upset if he or she senses that you are upset. Know how to care for your   · Your baby's umbilical cord stump will drop off on its own, usually between 1 and 2 weeks. To care for your baby's umbilical cord area:  ? Clean the area at the bottom of the cord 2 or 3 times a day. ? Pay special attention to the area where the cord attaches to the skin. ? Keep the diaper folded below the cord. ? Use a damp washcloth or cotton ball to sponge bathe your baby until the stump has come off. · Your baby's first dark stool is called meconium. After the meconium is passed, your baby will develop his or her own bowel pattern. ? Some babies, especially  babies, have several bowel movements a day. Others have one or two a day, or one every 2 to 3 days. ?  babies often have loose, yellow stools. Formula-fed babies have more formed stools. ? If your baby's stools look like little pellets, he or she is constipated. After 2 days of constipation, call your baby's doctor. · If your baby will be circumcised, you can care for him at home. ? Gently rinse his penis with warm water after every diaper change. Do not try to remove the film that forms on the penis. This film will go away on its own. Pat dry. ? Put petroleum ointment, such as Vaseline, on the area of the diaper that will touch your baby's penis. This will keep the diaper from sticking to your baby. ? Ask the doctor about giving your baby acetaminophen (Tylenol) for pain. Where can you learn more?   Go to http://www.gray.com/  Enter N257 in the search box to learn more about \"Week 37 of Your Pregnancy: Care Instructions. \"  Current as of: May 29, 2019Content Version: 12.4  © 4101-2114 [employer]. Care instructions adapted under license by [employer] (which disclaims liability or warranty for this information). If you have questions about a medical condition or this instruction, always ask your healthcare professional. [address] any warranty or liability for your use of this information. Counting Your Baby's Kicks: Care Instructions  Your Care Instructions    Counting your baby's kicks is one way your doctor can tell that your baby is healthy. Most women--especially in a first pregnancy--feel their baby move for the first time between 16 and 22 weeks. The movement may feel like flutters rather than kicks. Your baby may move more at certain times of the day. When you are active, you may notice less kicking than when you are resting. At your prenatal visits, your doctor will ask whether the baby is active. In your last trimester, your doctor may ask you to count the number of times you feel your baby move. Follow-up care is a key part of your treatment and safety. Be sure to make and go to all appointments, and call your doctor if you are having problems. It's also a good idea to know your test results and keep a list of the medicines you take. How do you count fetal kicks? · A common method of checking your baby's movement is to count the number of kicks or moves you feel in 1 hour. Ten movements (such as kicks, flutters, or rolls) in 1 hour are normal. Some doctors suggest that you count in the morning until you get to 10 movements. Then you can quit for that day and start again the next day. · Pick your baby's most active time of day to count. This may be any time from morning to evening. · If you do not feel 10 movements in an hour, your baby may be sleeping. Wait for the next hour and count again.   When should you call for help? Call your doctor now or seek immediate medical care if:    · You noticed that your baby has stopped moving or is moving much less than normal.    Watch closely for changes in your health, and be sure to contact your doctor if you have any problems. Where can you learn more? Go to http://melvin-alli.info/  Enter [id number] in the search box to learn more about \"Counting Your Baby's Kicks: Care Instructions. \"  Current as of: May 29, 2019Content Version: 12.4  © 3366-3670 [employer]. Care instructions adapted under license by [employer] (which disclaims liability or warranty for this information). If you have questions about a medical condition or this instruction, always ask your healthcare professional. [address] any warranty or liability for your use of this information.

## 2020-03-18 LAB
GLUCOSE BLD STRIP.AUTO-MCNC: 121 MG/DL (ref 65–100)
GLUCOSE BLD STRIP.AUTO-MCNC: 170 MG/DL (ref 65–100)
SERVICE CMNT-IMP: ABNORMAL
SERVICE CMNT-IMP: ABNORMAL

## 2020-03-18 PROCEDURE — 74011250636 HC RX REV CODE- 250/636: Performed by: OBSTETRICS & GYNECOLOGY

## 2020-03-18 PROCEDURE — 74011250637 HC RX REV CODE- 250/637: Performed by: OBSTETRICS & GYNECOLOGY

## 2020-03-18 PROCEDURE — 74011250636 HC RX REV CODE- 250/636: Performed by: ANESTHESIOLOGY

## 2020-03-18 PROCEDURE — 74011000250 HC RX REV CODE- 250: Performed by: OBSTETRICS & GYNECOLOGY

## 2020-03-18 PROCEDURE — 65270000029 HC RM PRIVATE

## 2020-03-18 PROCEDURE — 76060000078 HC EPIDURAL ANESTHESIA: Performed by: OBSTETRICS & GYNECOLOGY

## 2020-03-18 PROCEDURE — 82962 GLUCOSE BLOOD TEST: CPT

## 2020-03-18 PROCEDURE — 76010000391 HC C SECN FIRST 1 HR: Performed by: OBSTETRICS & GYNECOLOGY

## 2020-03-18 PROCEDURE — 76010000392 HC C SECN EA ADDL 0.5 HR: Performed by: OBSTETRICS & GYNECOLOGY

## 2020-03-18 PROCEDURE — 74011000272 HC RX REV CODE- 272

## 2020-03-18 PROCEDURE — 74011000250 HC RX REV CODE- 250: Performed by: ANESTHESIOLOGY

## 2020-03-18 PROCEDURE — 75410000002 HC LABOR FEE PER 1 HR: Performed by: OBSTETRICS & GYNECOLOGY

## 2020-03-18 PROCEDURE — 75410000003 HC RECOV DEL/VAG/CSECN EA 0.5 HR: Performed by: OBSTETRICS & GYNECOLOGY

## 2020-03-18 PROCEDURE — 74011250636 HC RX REV CODE- 250/636

## 2020-03-18 RX ORDER — SODIUM CHLORIDE, SODIUM LACTATE, POTASSIUM CHLORIDE, CALCIUM CHLORIDE 600; 310; 30; 20 MG/100ML; MG/100ML; MG/100ML; MG/100ML
125 INJECTION, SOLUTION INTRAVENOUS CONTINUOUS
Status: DISCONTINUED | OUTPATIENT
Start: 2020-03-18 | End: 2020-03-20 | Stop reason: HOSPADM

## 2020-03-18 RX ORDER — SIMETHICONE 80 MG
80 TABLET,CHEWABLE ORAL AS NEEDED
Status: DISCONTINUED | OUTPATIENT
Start: 2020-03-18 | End: 2020-03-20 | Stop reason: HOSPADM

## 2020-03-18 RX ORDER — NALOXONE HYDROCHLORIDE 0.4 MG/ML
0.4 INJECTION, SOLUTION INTRAMUSCULAR; INTRAVENOUS; SUBCUTANEOUS AS NEEDED
Status: DISCONTINUED | OUTPATIENT
Start: 2020-03-18 | End: 2020-03-20 | Stop reason: HOSPADM

## 2020-03-18 RX ORDER — IBUPROFEN 800 MG/1
800 TABLET ORAL EVERY 8 HOURS
Status: DISCONTINUED | OUTPATIENT
Start: 2020-03-18 | End: 2020-03-20 | Stop reason: HOSPADM

## 2020-03-18 RX ORDER — OXYCODONE HYDROCHLORIDE 5 MG/1
5 TABLET ORAL
Status: DISCONTINUED | OUTPATIENT
Start: 2020-03-18 | End: 2020-03-18

## 2020-03-18 RX ORDER — SODIUM CHLORIDE 0.9 % (FLUSH) 0.9 %
5-40 SYRINGE (ML) INJECTION EVERY 8 HOURS
Status: DISCONTINUED | OUTPATIENT
Start: 2020-03-18 | End: 2020-03-20 | Stop reason: HOSPADM

## 2020-03-18 RX ORDER — METFORMIN HYDROCHLORIDE 500 MG/1
500 TABLET ORAL 2 TIMES DAILY
Status: DISCONTINUED | OUTPATIENT
Start: 2020-03-18 | End: 2020-03-20 | Stop reason: HOSPADM

## 2020-03-18 RX ORDER — DIPHENHYDRAMINE HCL 25 MG
25 CAPSULE ORAL
Status: DISCONTINUED | OUTPATIENT
Start: 2020-03-18 | End: 2020-03-20 | Stop reason: HOSPADM

## 2020-03-18 RX ORDER — CEFAZOLIN SODIUM 1 G/3ML
INJECTION, POWDER, FOR SOLUTION INTRAMUSCULAR; INTRAVENOUS
Status: DISPENSED
Start: 2020-03-18 | End: 2020-03-18

## 2020-03-18 RX ORDER — OXYCODONE HYDROCHLORIDE 5 MG/1
10 TABLET ORAL
Status: DISCONTINUED | OUTPATIENT
Start: 2020-03-18 | End: 2020-03-18

## 2020-03-18 RX ORDER — OXYTOCIN 10 [USP'U]/ML
INJECTION, SOLUTION INTRAMUSCULAR; INTRAVENOUS AS NEEDED
Status: DISCONTINUED | OUTPATIENT
Start: 2020-03-18 | End: 2020-03-18 | Stop reason: HOSPADM

## 2020-03-18 RX ORDER — OXYCODONE AND ACETAMINOPHEN 5; 325 MG/1; MG/1
1 TABLET ORAL
Status: DISCONTINUED | OUTPATIENT
Start: 2020-03-18 | End: 2020-03-20 | Stop reason: HOSPADM

## 2020-03-18 RX ORDER — NALBUPHINE HYDROCHLORIDE 10 MG/ML
5 INJECTION, SOLUTION INTRAMUSCULAR; INTRAVENOUS; SUBCUTANEOUS
Status: DISCONTINUED | OUTPATIENT
Start: 2020-03-18 | End: 2020-03-18

## 2020-03-18 RX ORDER — ACETAMINOPHEN 325 MG/1
650 TABLET ORAL
Status: DISCONTINUED | OUTPATIENT
Start: 2020-03-18 | End: 2020-03-20 | Stop reason: HOSPADM

## 2020-03-18 RX ORDER — ONDANSETRON 2 MG/ML
4 INJECTION INTRAMUSCULAR; INTRAVENOUS
Status: DISCONTINUED | OUTPATIENT
Start: 2020-03-18 | End: 2020-03-20 | Stop reason: HOSPADM

## 2020-03-18 RX ORDER — MORPHINE SULFATE 0.5 MG/ML
INJECTION, SOLUTION EPIDURAL; INTRATHECAL; INTRAVENOUS AS NEEDED
Status: DISCONTINUED | OUTPATIENT
Start: 2020-03-18 | End: 2020-03-18 | Stop reason: HOSPADM

## 2020-03-18 RX ORDER — KETOROLAC TROMETHAMINE 30 MG/ML
30 INJECTION, SOLUTION INTRAMUSCULAR; INTRAVENOUS
Status: DISPENSED | OUTPATIENT
Start: 2020-03-18 | End: 2020-03-19

## 2020-03-18 RX ORDER — ZOLPIDEM TARTRATE 5 MG/1
5 TABLET ORAL
Status: DISCONTINUED | OUTPATIENT
Start: 2020-03-18 | End: 2020-03-20 | Stop reason: HOSPADM

## 2020-03-18 RX ORDER — ONDANSETRON 2 MG/ML
INJECTION INTRAMUSCULAR; INTRAVENOUS AS NEEDED
Status: DISCONTINUED | OUTPATIENT
Start: 2020-03-18 | End: 2020-03-18 | Stop reason: HOSPADM

## 2020-03-18 RX ORDER — SODIUM CHLORIDE 0.9 % (FLUSH) 0.9 %
5-40 SYRINGE (ML) INJECTION AS NEEDED
Status: DISCONTINUED | OUTPATIENT
Start: 2020-03-18 | End: 2020-03-20 | Stop reason: HOSPADM

## 2020-03-18 RX ORDER — SWAB
1 SWAB, NON-MEDICATED MISCELLANEOUS DAILY
Status: DISCONTINUED | OUTPATIENT
Start: 2020-03-18 | End: 2020-03-20 | Stop reason: HOSPADM

## 2020-03-18 RX ORDER — OXYTOCIN/0.9 % SODIUM CHLORIDE 20/1000 ML
125-500 PLASTIC BAG, INJECTION (ML) INTRAVENOUS ONCE
Status: ACTIVE | OUTPATIENT
Start: 2020-03-18 | End: 2020-03-18

## 2020-03-18 RX ORDER — HYDROMORPHONE HYDROCHLORIDE 2 MG/ML
0.5 INJECTION, SOLUTION INTRAMUSCULAR; INTRAVENOUS; SUBCUTANEOUS
Status: ACTIVE | OUTPATIENT
Start: 2020-03-18 | End: 2020-03-19

## 2020-03-18 RX ORDER — DOCUSATE SODIUM 100 MG/1
100 CAPSULE, LIQUID FILLED ORAL 2 TIMES DAILY
Status: DISCONTINUED | OUTPATIENT
Start: 2020-03-18 | End: 2020-03-20 | Stop reason: HOSPADM

## 2020-03-18 RX ORDER — BUPIVACAINE HYDROCHLORIDE 5 MG/ML
INJECTION, SOLUTION EPIDURAL; INTRACAUDAL AS NEEDED
Status: DISCONTINUED | OUTPATIENT
Start: 2020-03-18 | End: 2020-03-18 | Stop reason: HOSPADM

## 2020-03-18 RX ADMIN — SODIUM CHLORIDE, POTASSIUM CHLORIDE, SODIUM LACTATE AND CALCIUM CHLORIDE 125 ML/HR: 600; 310; 30; 20 INJECTION, SOLUTION INTRAVENOUS at 04:15

## 2020-03-18 RX ADMIN — KETOROLAC TROMETHAMINE 30 MG: 30 INJECTION, SOLUTION INTRAMUSCULAR at 17:37

## 2020-03-18 RX ADMIN — CEFAZOLIN 2 G: 1 INJECTION, POWDER, FOR SOLUTION INTRAMUSCULAR; INTRAVENOUS at 02:33

## 2020-03-18 RX ADMIN — OXYCODONE HYDROCHLORIDE AND ACETAMINOPHEN 1 TABLET: 5; 325 TABLET ORAL at 10:39

## 2020-03-18 RX ADMIN — OXYCODONE HYDROCHLORIDE AND ACETAMINOPHEN 1 TABLET: 5; 325 TABLET ORAL at 23:29

## 2020-03-18 RX ADMIN — BUPIVACAINE HYDROCHLORIDE 5 ML: 5 INJECTION, SOLUTION EPIDURAL; INTRACAUDAL; PERINEURAL at 01:19

## 2020-03-18 RX ADMIN — MORPHINE SULFATE 2.5 MG: 0.5 INJECTION, SOLUTION EPIDURAL; INTRATHECAL; INTRAVENOUS at 01:40

## 2020-03-18 RX ADMIN — OXYTOCIN 30 UNITS: 10 INJECTION, SOLUTION INTRAMUSCULAR; INTRAVENOUS at 01:22

## 2020-03-18 RX ADMIN — OXYCODONE HYDROCHLORIDE AND ACETAMINOPHEN 1 TABLET: 5; 325 TABLET ORAL at 19:35

## 2020-03-18 RX ADMIN — BUPIVACAINE HYDROCHLORIDE 10 ML: 5 INJECTION, SOLUTION EPIDURAL; INTRACAUDAL; PERINEURAL at 01:11

## 2020-03-18 RX ADMIN — BUPIVACAINE HYDROCHLORIDE 5 ML: 5 INJECTION, SOLUTION EPIDURAL; INTRACAUDAL; PERINEURAL at 01:16

## 2020-03-18 RX ADMIN — MORPHINE SULFATE 2.5 MG: 0.5 INJECTION, SOLUTION EPIDURAL; INTRATHECAL; INTRAVENOUS at 01:35

## 2020-03-18 RX ADMIN — ONDANSETRON HYDROCHLORIDE 4 MG: 2 SOLUTION INTRAMUSCULAR; INTRAVENOUS at 01:24

## 2020-03-18 RX ADMIN — KETOROLAC TROMETHAMINE 30 MG: 30 INJECTION, SOLUTION INTRAMUSCULAR at 10:32

## 2020-03-18 NOTE — PROGRESS NOTES
Pt arrived to L&D with c/o contractions. Pt states she was seen here earlier today to rule out labor and was 3.5cm and did not make cervical change. Pt denies ROM. Pt states she is GDM and on Metformin. CNM at bedside at pt arrival to evaluate. 2133 SVE by Adeel Peacock CNM, 8/100/floating. Bedside ultrasound done to confirm presentation, vertex. 0104 SVE by CNM, pt c/c. CNM states she would like to AROM pt and delivery would likey follow quickly so this RN called charge nurse and nursery for delivery. 0106 AROM by midwife. Midwife requested funal pressure from Ova Rahat, RN    5611 CNM states there is a cord prolapse. Dr. Vinh Kwan and additional assistance called. CNM states to move to OR for stat c/s.    0110 In to OR 1.    0120 Viable male infant born via c/s, apgars 6/8.    0214 Out of OR 1, back to room 210.    0615 TRANSFER - OUT REPORT:    Verbal report given to JJ Rosales RN(name) on Dolph Ao  being transferred to MIU(unit) for routine progression of care       Report consisted of patients Situation, Background, Assessment and   Recommendations(SBAR). Information from the following report(s) SBAR, Kardex, Intake/Output, MAR, Recent Results and Med Rec Status was reviewed with the receiving nurse. Lines:   Peripheral IV 03/17/20 Right Wrist (Active)   Site Assessment Clean, dry, & intact 3/17/2020 10:17 PM   Phlebitis Assessment 0 3/17/2020 10:17 PM   Infiltration Assessment 0 3/17/2020 10:17 PM   Dressing Status Clean, dry, & intact 3/17/2020 10:17 PM   Dressing Type Tape;Transparent 3/17/2020 10:17 PM   Hub Color/Line Status Pink 3/17/2020 10:17 PM        Opportunity for questions and clarification was provided.       Patient transported with:   Registered Nurse

## 2020-03-18 NOTE — PROGRESS NOTES
[facility] Labor Progress Note     Patient: Dale Yanes MRN: [id number]  SSN: [id number]    YOB: 1985  Age: 37 y.o. Sex: female        Subjective:   Patient coping well with contractions. Epidural working well. Objective:   Blood pressure 126/70, pulse 74, temperature 98.3 °F (36.8 °C), resp. rate 16, height 5' 2\" (1.575 m), weight 194 lb (88 kg), last menstrual period 2019, SpO2 97 %, not currently breastfeeding. Fetal heart baseline 150's and this provider went in to room after noting 1.5 minute deceleration with dylon of 60's. V/# done and C/C with bulging BOW at introitus. Vertex palpated and -2 ballotable. With fundal pressure by RN, gentle AROM done with index finger to allow for slow descent of head and prolapse of cord noted. Patient attempted to push x 1 to see if she could push past cord while Dr. Osmany Marie notified. Head ascended higher in to uterus with maternal attempt one attempt at pushing. Patient transferred to OR for emergency operative delivery while this provider continued to elevate head until operative delivery completed by Dr. Osmany Marie.          Assessment:     37w6d  Prolapsed Cord      Plan:   Emergent LTCS for Prolapsed cord for fetal indication    [place] CNM

## 2020-03-18 NOTE — ROUTINE PROCESS
SBAR IN Report: Mother Verbal report received from A Stamper RN (full name & credentials) on this patient, who is now being transferred from L&D (unit) for routine progression of care. Report consisted of patient's Situation, Background, Assessment and Recommendations (SBAR). [place] ID bands were compared with the identification form, and verified with the patient and transferring nurse. Information from the SBAR, Kardex, Intake/Output, MAR and [place] and the [place] Report was reviewed with the transferring nurse; opportunity for questions and clarification provided.

## 2020-03-18 NOTE — ANESTHESIA PREPROCEDURE EVALUATION
Relevant Problems   No relevant active problems       Anesthetic History   No history of anesthetic complications            Review of Systems / Medical History  Patient summary reviewed and pertinent labs reviewed    Pulmonary  Within defined limits                 Neuro/Psych   Within defined limits           Cardiovascular  Within defined limits                Exercise tolerance: >4 METS     GI/Hepatic/Renal  Within defined limits              Endo/Other    Diabetes         Other Findings              Physical Exam    Airway  Mallampati: II  TM Distance: 4 - 6 cm  Neck ROM: normal range of motion   Mouth opening: Normal     Cardiovascular  Regular rate and rhythm,  S1 and S2 normal,  no murmur, click, rub, or gallop  Rhythm: regular  Rate: normal         Dental  No notable dental hx       Pulmonary  Breath sounds clear to auscultation               Abdominal  GI exam deferred       Other Findings            Anesthetic Plan    ASA: 2  Anesthesia type: epidural            Anesthetic plan and risks discussed with: Patient      Charting completed after epidural placement.

## 2020-03-18 NOTE — H&P
History & Physical    Name: Damon Medina MRN: [id number]  SSN: [id number]    YOB: 1985  Age: 37 y.o. Sex: female        Subjective:     Estimated Date of Delivery: 20  OB History    Para Term  AB Living   6 4 4   1     SAB TAB Ectopic Molar Multiple Live Births   1                # Outcome Date GA Lbr Yosvany/2nd Weight Sex Delivery Anes PTL Lv   6 Current            5 SAB            4 Term      Vag-Spont      3 Term      Vag-Spont      2 Term      Vag-Spont      1 Term      Vag-Spont          Ms. Jessica Rod is admitted with pregnancy at 37w5d for active labor. Prenatal course was complicated by diabetes - gestational and diabetes - likely type 2,  And AMA, declined genetic testing. Grand multip. Last baby 7 years ago. Ros Knapp Please see prenatal records for details. She is not anemic    Past Medical History:   Diagnosis Date    Gestational diabetes     Gestational diabetes mellitus (GDM)      No past surgical history on file. Social History     Occupational History    Not on file   Tobacco Use    Smoking status: Never Smoker    Smokeless tobacco: Never Used   Substance and Sexual Activity    Alcohol use: Never     Frequency: Never    Drug use: Never    Sexual activity: Yes     Partners: Male     Birth control/protection: None     Family History   Problem Relation Age of Onset    Hypertension Mother     Diabetes Mother        No Known Allergies  Prior to Admission medications    Medication Sig Start Date End Date Taking? Authorizing Provider   fluticasone propionate (FLONASE) 50 mcg/actuation nasal spray 2 Sprays by Both Nostrils route daily. 3/1/20   Kenia Bardales MD   acetaminophen (TYLENOL EXTRA STRENGTH) 500 mg tablet Take 2 Tabs by mouth every six (6) hours as needed for Pain or Fever.  3/1/20   Kenia Bardales MD   metFORMIN (GLUCOPHAGE) 500 mg tablet TAKE 1 TABLET BY MOUTH EVERY DAY AT BEDTIME MAY INCREASE TO 2 TABLETS AT BEDTIME IF FASTING SUGARS REMAIN GREATER THAN OR EQUAL TO 100MG DL 1/30/20   Provider, Historical   PNV NG.42/SJONWPL fum/folic ac (PRENATAL PO) Take  by mouth. Provider, Historical   insulin NPH (NOVOLIN N, HUMULIN N) 100 unit/mL injection Inject 16 units at bedtime. 11/4/19   Silvia Bergeron MD   insulin syringe-needle U-100 (BD INSULIN SYRINGE ULTRA-FINE) 1 mL 31 gauge x 15/64\" syrg Use to inject insulin once daily. Dx. Code E11.65 11/4/19   Silvia Bergeron MD        Review of Systems: Pertinent items are noted in HPI. Objective:     Vitals: There were no vitals filed for this visit. Physical Exam:  Patient with distress. Breast:  deferred  Heart: Regular rate and rhythm  Lung: clear to auscultation throughout lung fields, no wheezes, no rales, no rhonchi and normal respiratory effort  Back: costovertebral angle tenderness absent  Abdomen: soft, nontender, protuberant  Fundus: soft and non tender  Perineum: blood absent, amniotic fluid absent  Cervical Exam: 8 cm dilated    100% effaced    -5 station    Presenting Part: cephalic by ultrasoud  Cervical Position: anterior  Consistency: Soft  Lower Extremities:  - Edema No  Membranes:  bulging  Fetal Heart Rate: unable to determine due to poor quality at time of H and P    Prenatal Labs:   Lab Results   Component Value Date/Time    Carissatredario, External POSITIVE 03/09/2020         Assessment/Plan:     Active Problems:    Pregnancy (3/17/2020)         Plan: Admit for Labor  Progressing normally, Continue plan for vaginal delivery. Group B Strep was positive, will treat prophylactically with penicillin.

## 2020-03-18 NOTE — ANESTHESIA POSTPROCEDURE EVALUATION
Procedure(s):   SECTION.     epidural    Anesthesia Post Evaluation      Multimodal analgesia: multimodal analgesia not used between 6 hours prior to anesthesia start to PACU discharge  Patient location during evaluation: PACU  Patient participation: complete - patient participated  Level of consciousness: awake  Pain management: adequate  Airway patency: patent  Anesthetic complications: no  Cardiovascular status: acceptable, blood pressure returned to baseline and hemodynamically stable  Respiratory status: acceptable  Hydration status: acceptable  Post anesthesia nausea and vomiting:  controlled      Vitals Value Taken Time   BP 91/53 3/18/2020  2:20 AM   Temp     Pulse 72 3/18/2020  2:20 AM   Resp 14 3/18/2020  2:20 AM   SpO2 100 % 3/18/2020  2:20 AM

## 2020-03-18 NOTE — LACTATION NOTE
This note was copied from a baby's chart. Mom states has been giving formula since baby won't latch. Offered to assist with latch, but baby just had formula. Mom has short nipples and shown how to hand stimulate and use latch assist to draw nipples out. Mom states had not been able to nurse first two children for long due to baby's not latching. Discussed possible nipple confusion. Mom states will try to breast feed later. Reviewed breastfeeding basics:  Supply and demand,  stomach size, early  Feeding cues, skin to skin, positioning and baby led latch-on, assymetrical latch with signs of good, deep latch vs shallow, feeding frequency and duration, and log sheet for tracking infant feedings and output. Breastfeeding Booklet and Warm line information given. Discussed typical  weight loss and the importance of infant weight checks with pediatrician 1-2 post discharge. Hand Expression Education:  Mom taught how to manually hand express her colostrum. Emphasized the importance of providing infant with valuable colostrum as infant rests skin to skin at breast.  Aware to avoid extended periods of non-feeding. Aware to offer 10-20+ drops of colostrum every 2-3 hours until infant is latching and nursing effectively. Taught the rationale behind this low tech but highly effective evidence based practice. Discussed with mother her plan for feeding. Reviewed the benefits of exclusive breast milk feeding during the hospital stay. Informed her of the risks of using formula to supplement in the first few days of life as well as the benefits of successful breast milk feeding; referred her to the Breastfeeding booklet about this information. She acknowledges understanding of information reviewed and states that it is her plan to both breast and formula feed her infant. Will support her choice and offer additional information as needed.      Pt will successfully establish breastfeeding by feeding in response to early feeding cues   or wake every 3h, will obtain deep latch, and will keep log of feedings/output. Taught to BF at hunger cues and or q 2-3 hrs and to offer 10-20 drops of hand expressed colostrum at any non-feeds. Pt will successfully establish breastfeeding by feeding in response to early feeding cues   or wake every 3h, will obtain deep latch, and will keep log of feedings/output. Taught to BF at hunger cues and or q 2-3 hrs and to offer 10-20 drops of hand expressed colostrum at any non-feeds. Breast Assessment  Left Breast: Medium  Left Nipple: Everted, Intact, Short(tends to shorten.   Given latch assist to draw nipple out.)  Right Breast: Medium  Right Nipple: Everted, Intact, Short  Breast- Feeding Assessment  Attends Breast-Feeding Classes: No  Breast-Feeding Experience: Yes

## 2020-03-18 NOTE — BRIEF OP NOTE
BRIEF OPERATIVE NOTE    Date of Procedure: 3/18/2020   Preoperative Diagnosis: Cord Prolapse  Postoperative Diagnosis:same, transverse position   Procedure(s):   SECTION- Primary  Surgeon(s) and Role:     Bard Colby MD - Primary         Surgical Assistant: Santy Friedman    Surgical Staff:  Jessica Hernandez: Isa Saba, RN  Circ-2: Shamika Carranza, RN; Jose Martinez, RN; Jaun Pierre, RN  Scrub Tech-1: Melda Mcardle  Surg Asst-1: [place] Souza  Event Time In   Incision Start 0117   Incision Close      Anesthesia: epidural   Estimated Blood Loss: 700  Specimens:   ID Type Source Tests Collected by Time Destination   1 :  Placenta Uterus  Veronika Padilla MD 3/18/2020 0154 Discarded      Findings: large amount of cord in vagina, pulsating at 79, baby transverse back down and breech extraction performed, APGARS 6, 8, 8, normal tubes ovaries, and uterus   Complications: cord prolapse, STAT   Implants: gelfoam on uterine    Dictated.

## 2020-03-18 NOTE — PROGRESS NOTES
12: Lab called RN with cord blood results    Arterial 6.84  Venous 7.30    Telephone with readback      0147: RN reported it to CNM. No orders received.

## 2020-03-18 NOTE — PROGRESS NOTES
1115-Attempted to get patient oob without success. Patient complaint of lightheadedness and dizzness. Will reattempt. 1440-Attempted to get oob without success after eating brunch. Patient complaint of lightheadedness and dizzness. Patient states pain is 2/10 with movement. /73, heart rate 101, temp 98.0, 22 resp. Patient mews #3. acchucheck done. Blood glucose 170. Spoke to Dr Cally Erwin. New orders given to restart home metformin. See md orders. Per Dr Cally Erwin, leave king in at this time up to her 24hr time and reattempt oob in a couple of hours.

## 2020-03-18 NOTE — PROGRESS NOTES
Called to room emergently for cord prolapse. Took pt emergently to OR for STAT . FHR 70s once in the OR. Please see operative note for details.

## 2020-03-18 NOTE — OP NOTES
[facility]  OPERATIVE REPORT    Name:  Vicki Aguilar  MR#:  [id number]  :  1985  ACCOUNT #:  [de-identified]  DATE OF SERVICE:  2020    PREOPERATIVE DIAGNOSIS:  Cord prolapse. POSTOPERATIVE DIAGNOSIS:  Cord prolapse with transverse position. PROCEDURE PERFORMED:  Primary low transverse . SURGEON:  Crys Malloy MD    ASSISTANT:  Clementine Emmanuel    ANESTHESIA:  Epidural.    COMPLICATIONS:  Cord prolapse with stat . SPECIMENS REMOVED:  Placenta, discarded. IMPLANTS:  Gelfoam on uterine incision. ESTIMATED BLOOD LOSS:  700    FINDINGS:  Large amount of cord in vagina requiring stat , pulsating at 70 just prior to section. Baby transverse back down with breech extraction performed, Apgars 6, 8, and 8 at 1, 5, and 10 minutes, respectively. Normal tubes and ovaries. Uterus normal.    PROCEDURE:  I was called emergently to the patient's bedside for a large cord prolapse. The nurse and midwife were present. We pushed the fetal presenting part out of the pelvis as we rushed back to the operating room. Of note, the fetal heart rate was 70 just prior to incision. The patient was taken to the OR and prepped and draped in an emergent fashion. Transverse incision was carried through to the level of the fascia. The fascia was scored at the midline and extended bluntly. The midline was identified and extended bluntly. The peritoneum was identified and extended bluntly. Bladder blade was placed. The uterine incision was made. Of note, the uterine muscle was quite thick for a laboring uterus. Uterine incision was made. Entry into the amniotic sac revealed clear-colored fluid. Uterine incision was extended manually in the cephalad fashion. At this point, the infant was noted to be transverse back down and I reached for a lower extremity and grasped both legs for breech extraction.   The infant was delivered to the level of the shoulder. Shoulders were rotated 180 to deliver both shoulders. A finger was placed into the infant's mouth for head flexion and the baby was delivered. Cord was doubly clamped and cut and the infant was immediately handed to awaiting NICU. Apgars were 6, 8, and 8 at 1, 5, and 10 minutes, respectively. Placenta delivered manually intact with a three-vessel cord. The uterus was cleared of all clot and debris. Uterine incision was repaired in two layers, first with running locking stitch of 0 Vicryl, second a running imbricating stitch of 0 Monocryl. Excellent hemostasis was maintained. Gelfoam was placed on the uterine incision. The colic gutters were cleared of all clot and debris. The muscles were then inspected and was found to have a wide diastasis so the muscles were not reapproximated. I turned my attention then to the fascia which was closed in a running nonlocking stitch of 0 Vicryl. Subcutaneous tissue was reapproximated with 2-0 Monocryl in a running nonlocking fashion. Skin was reapproximated with 4-0 Monocryl in a subcuticular fashion. A sterile dressing was placed and the patient was taken to recovery room in stable condition.       MD SOFIA [email or web address]/  D:  03/18/2020 2:21  T:  03/18/2020 13:08  JOB #:  [id number]

## 2020-03-18 NOTE — ROUTINE PROCESS
Bedside and Verbal shift change report given Merline Lowe (oncoming nurse) by Susana Fish. Adilia Ramos (offgoing nurse).  Report given with SBAR, Kardex, Intake/Output and MAR.

## 2020-03-18 NOTE — ANESTHESIA PROCEDURE NOTES
Epidural Block    Start time: 3/17/2020 10:32 PM  End time: 3/17/2020 10:43 PM  Performed by: Roxy Carter MD  Authorized by: Roxy Carter MD     Pre-Procedure  Indication: labor epidural    Preanesthetic Checklist: patient identified, risks and benefits discussed, anesthesia consent, timeout performed and anesthesia consent    Timeout Time: 22:32        Epidural:   Patient position:  Seated  Prep region:  Lumbar  Prep: Betadine    Location:  L3-4    Needle and Epidural Catheter:   Needle Type:  Tuohy  Needle Gauge:  17 G  Injection Technique:  Loss of resistance using air  Attempts:  1  Catheter Size:  18 G  Events: no paresthesia and negative aspiration test    Test Dose:  Lidocaine 1.5% w/ epi and negative    Assessment:   Catheter Secured:  Tegaderm and tape  Insertion:  Uncomplicated  Patient tolerance:  Patient tolerated the procedure well with no immediate complications

## 2020-03-18 NOTE — PROGRESS NOTES
Post-Operative Day Number 1 Progress Note    Patient doing well post-op day 1 from  delivery without significant complaints. Pain controlled on current medication. Voiding without difficulty, normal lochia. Vitals:    Patient Vitals for the past 8 hrs:   BP Temp Pulse Resp SpO2   20 1052 117/62 97.8 °F (36.6 °C) 71 18    /18/20 0630 103/56 98.7 °F (37.1 °C) 80 18    /18/20 0557     98 %   /18/20 0555 106/57  86     03/18/20 0552     99 %   /18/20 0547     99 %   /18/20 0542     99 %   /18/20 0540 95/52  83     /18/20 0537     99 %   /18/20 0532     99 %   /18/20 0527     98 %   /18/20 0525 97/55  77     //20 0522     99 %   /18/20 0517     99 %   /18/20 0512     99 %   /18/20 0510 92/51  82     /18/20 0507     99 %   03/18/20 0502     100 %   03/18/20 0457     99 %   03/18/20 0455 91/52  80     03/18/20 0452     99 %   03/18/20 0447     99 %   03/18/20 0442     99 %   03/18/20 0440 90/54  83     03/18/20 0437     99 %   03/18/20 0432     99 %   03/18/20 0427     99 %   03/18/20 0425 102/57  81     /18/20 0422     99 %   /18/20 0417     99 %   03/18/20 0412     100 %   03/18/20 0410 106/55  78     03/18/20 0407     99 %   03/18/20 0402     98 %   03/18/20 0357     98 %   03/18/20 0355 104/58  75     03/18/20 0352     98 %   03/18/20 0347     98 %   03/18/20 0342     98 %   03/18/20 0340 105/56  78     03/18/20 0337     98 %   03/18/20 0332     98 %     Temp (24hrs), Av.2 °F (36.8 °C), Min:97.8 °F (36.6 °C), Max:98.7 °F (37.1 °C)      Vital signs stable, afebrile. Exam:  Patient without distress. Abdomen soft, fundus firm at level of umbilicus, nontender. Incision dry and clean without erythema.                Lower extremities are negative for swelling, cords or tenderness. Labs:   Recent Results (from the past 24 hour(s))   CBC WITH AUTOMATED DIFF    Collection Time: 20  9:52 PM   Result Value Ref Range    WBC 15.4 (H) 3.6 - 11.0 K/uL    RBC 4.25 3.80 - 5.20 M/uL    HGB 13.8 11.5 - 16.0 g/dL    HCT 39.7 35.0 - 47.0 %    MCV 93.4 80.0 - 99.0 FL    MCH 32.5 26.0 - 34.0 PG    MCHC 34.8 30.0 - 36.5 g/dL    RDW 13.2 11.5 - 14.5 %    PLATELET 326 289 - 999 K/uL    MPV 11.3 8.9 - 12.9 FL    NRBC 0.0 0  WBC    ABSOLUTE NRBC 0.00 0.00 - 0.01 K/uL    NEUTROPHILS 81 (H) 32 - 75 %    LYMPHOCYTES 14 12 - 49 %    MONOCYTES 4 (L) 5 - 13 %    EOSINOPHILS 0 0 - 7 %    BASOPHILS 0 0 - 1 %    IMMATURE GRANULOCYTES 1 (H) 0.0 - 0.5 %    ABS. NEUTROPHILS 12.4 (H) 1.8 - 8.0 K/UL    ABS. LYMPHOCYTES 2.2 0.8 - 3.5 K/UL    ABS. MONOCYTES 0.7 0.0 - 1.0 K/UL    ABS. EOSINOPHILS 0.1 0.0 - 0.4 K/UL    ABS. BASOPHILS 0.0 0.0 - 0.1 K/UL    ABS. IMM. GRANS. 0.1 (H) 0.00 - 0.04 K/UL    DF AUTOMATED     GLUCOSE, POC    Collection Time: 20 10:08 PM   Result Value Ref Range    Glucose (POC) 110 (H) 65 - 100 mg/dL    Performed by Nathanael Baer, POC    Collection Time: 20  8:03 AM   Result Value Ref Range    Glucose (POC) 121 (H) 65 - 100 mg/dL    Performed by Yane Craig (PCT)        Assessment and Plan:  Patient appears to be having uncomplicated post- course. Continue routine post-op care and maternal education.

## 2020-03-19 LAB
BASOPHILS # BLD: 0 K/UL (ref 0–0.1)
BASOPHILS NFR BLD: 0 % (ref 0–1)
DIFFERENTIAL METHOD BLD: ABNORMAL
EOSINOPHIL # BLD: 0.1 K/UL (ref 0–0.4)
EOSINOPHIL NFR BLD: 1 % (ref 0–7)
ERYTHROCYTE [DISTWIDTH] IN BLOOD BY AUTOMATED COUNT: 13.5 % (ref 11.5–14.5)
HBV SURFACE AG SER QL: <0.1 INDEX
HBV SURFACE AG SER QL: NEGATIVE
HCT VFR BLD AUTO: 30.9 % (ref 35–47)
HCV AB SERPL QL IA: NONREACTIVE
HCV COMMENT,HCGAC: NORMAL
HGB BLD-MCNC: 10.2 G/DL (ref 11.5–16)
HIV1 P24 AG SERPL QL IA: NONREACTIVE
HIV1+2 AB SERPL QL IA: NONREACTIVE
IMM GRANULOCYTES # BLD AUTO: 0.1 K/UL (ref 0–0.04)
IMM GRANULOCYTES NFR BLD AUTO: 0 % (ref 0–0.5)
LYMPHOCYTES # BLD: 1.5 K/UL (ref 0.8–3.5)
LYMPHOCYTES NFR BLD: 12 % (ref 12–49)
MCH RBC QN AUTO: 32.2 PG (ref 26–34)
MCHC RBC AUTO-ENTMCNC: 33 G/DL (ref 30–36.5)
MCV RBC AUTO: 97.5 FL (ref 80–99)
MONOCYTES # BLD: 0.8 K/UL (ref 0–1)
MONOCYTES NFR BLD: 6 % (ref 5–13)
NEUTS SEG # BLD: 9.9 K/UL (ref 1.8–8)
NEUTS SEG NFR BLD: 80 % (ref 32–75)
NRBC # BLD: 0 K/UL (ref 0–0.01)
NRBC BLD-RTO: 0 PER 100 WBC
PLATELET # BLD AUTO: 174 K/UL (ref 150–400)
PMV BLD AUTO: 10.9 FL (ref 8.9–12.9)
RBC # BLD AUTO: 3.17 M/UL (ref 3.8–5.2)
WBC # BLD AUTO: 12.4 K/UL (ref 3.6–11)

## 2020-03-19 PROCEDURE — 85025 COMPLETE CBC W/AUTO DIFF WBC: CPT

## 2020-03-19 PROCEDURE — 74011250637 HC RX REV CODE- 250/637: Performed by: OBSTETRICS & GYNECOLOGY

## 2020-03-19 PROCEDURE — 65270000029 HC RM PRIVATE

## 2020-03-19 PROCEDURE — 36415 COLL VENOUS BLD VENIPUNCTURE: CPT

## 2020-03-19 RX ORDER — IBUPROFEN 800 MG/1
800 TABLET ORAL EVERY 8 HOURS
Qty: 60 TAB | Refills: 0 | Status: SHIPPED | OUTPATIENT
Start: 2020-03-19 | End: 2021-06-18 | Stop reason: SDUPTHER

## 2020-03-19 RX ORDER — OXYCODONE AND ACETAMINOPHEN 5; 325 MG/1; MG/1
1 TABLET ORAL
Qty: 18 TAB | Refills: 0 | Status: SHIPPED | OUTPATIENT
Start: 2020-03-19 | End: 2020-03-22

## 2020-03-19 RX ADMIN — METFORMIN HYDROCHLORIDE 500 MG: 500 TABLET ORAL at 15:26

## 2020-03-19 RX ADMIN — OXYCODONE HYDROCHLORIDE AND ACETAMINOPHEN 1 TABLET: 5; 325 TABLET ORAL at 08:05

## 2020-03-19 RX ADMIN — OXYCODONE HYDROCHLORIDE AND ACETAMINOPHEN 1 TABLET: 5; 325 TABLET ORAL at 13:27

## 2020-03-19 RX ADMIN — METFORMIN HYDROCHLORIDE 500 MG: 500 TABLET ORAL at 09:29

## 2020-03-19 RX ADMIN — IBUPROFEN 800 MG: 800 TABLET ORAL at 13:28

## 2020-03-19 RX ADMIN — OXYCODONE HYDROCHLORIDE AND ACETAMINOPHEN 1 TABLET: 5; 325 TABLET ORAL at 03:22

## 2020-03-19 RX ADMIN — METFORMIN HYDROCHLORIDE 500 MG: 500 TABLET ORAL at 08:05

## 2020-03-19 RX ADMIN — SIMETHICONE CHEW TAB 80 MG 80 MG: 80 TABLET ORAL at 17:38

## 2020-03-19 RX ADMIN — DOCUSATE SODIUM 100 MG: 100 CAPSULE, LIQUID FILLED ORAL at 08:05

## 2020-03-19 RX ADMIN — DOCUSATE SODIUM 100 MG: 100 CAPSULE, LIQUID FILLED ORAL at 17:38

## 2020-03-19 RX ADMIN — IBUPROFEN 800 MG: 800 TABLET ORAL at 05:32

## 2020-03-19 RX ADMIN — Medication 1 TABLET: at 08:05

## 2020-03-19 RX ADMIN — IBUPROFEN 800 MG: 800 TABLET ORAL at 21:32

## 2020-03-19 RX ADMIN — OXYCODONE HYDROCHLORIDE AND ACETAMINOPHEN 1 TABLET: 5; 325 TABLET ORAL at 17:38

## 2020-03-19 RX ADMIN — OXYCODONE HYDROCHLORIDE AND ACETAMINOPHEN 1 TABLET: 5; 325 TABLET ORAL at 21:32

## 2020-03-19 NOTE — DISCHARGE SUMMARY
Obstetrical Discharge Summary     Name: Kenan Campbell MRN: [id number]  SSN: [id number]    YOB: 1985  Age: 37 y.o. Sex: female      Admit Date: 3/17/2020    Discharge Date: 3/20/2020    Admitting Physician: Mitch Kelly CNM     Attending Physician:  Pennie Lindsey MD     * Admission Diagnoses: Pregnancy [Z34.90]    * Discharge Diagnoses:   Information for the patient's :  Danis Reich Male [[id number]]   Delivery of a 7 lb 13.6 oz (3.56 kg) male infant via , Low Transverse on 3/18/2020 at 1:20 AM  by Lizzie Knight. Apgars were 6  and 8 . Additional Diagnoses:   Hospital Problems as of 3/19/2020 Date Reviewed: 3/16/2020          Codes Class Noted - Resolved POA    Pregnancy ICD-10-CM: Z34.90  ICD-9-CM: V22.2  3/17/2020 - Present Unknown             Lab Results   Component Value Date/Time    GrBStrep, External POSITIVE 2020      Immunization History   Administered Date(s) Administered    Tdap 2020       * Procedures:   Procedure(s):   SECTION    * Discharge Condition: stable    * Hospital Course: Normal hospital course following the delivery. * Disposition: home with office follow-up    Discharge Medications:   Current Discharge Medication List      START taking these medications    Details   ibuprofen (MOTRIN) 800 mg tablet Take 1 Tab by mouth every eight (8) hours. Qty: 60 Tab, Refills: 0      oxyCODONE-acetaminophen (PERCOCET) 5-325 mg per tablet Take 1 Tab by mouth every four (4) hours as needed for Pain for up to 3 days. Max Daily Amount: 6 Tabs. Qty: 18 Tab, Refills: 0    Associated Diagnoses: Postoperative pain             * Follow-up Care/Patient Instructions:   Activity: activity as tolerated  Diet: general  Wound Care: as directed    Follow-up Information     Follow up With Specialties Details Why Contact Info    None    None (395) Patient stated that they have no PCP      Pennie Lindsey MD Obstetrics & Gynecology, Gynecology, Obstetrics In 6 weeks  [address] [facility] [address]  [phone]             Signed By:  Cody Bryan MD     March 19, 2020

## 2020-03-19 NOTE — PROGRESS NOTES
Post-Operative Day Number 2 Progress Note    Patient doing well post-op day 2 from  delivery without significant complaints. Pain controlled on current medication. Voiding without difficulty, normal lochia. Vitals:    Patient Vitals for the past 8 hrs:   BP Temp Pulse Resp   20 0609 104/63 98.5 °F (36.9 °C) 82 18   20 0230 109/58 99.5 °F (37.5 °C) 86 16     Temp (24hrs), Av.6 °F (37 °C), Min:97.8 °F (36.6 °C), Max:99.5 °F (37.5 °C)      Vital signs stable, afebrile. Exam:  Patient without distress. Abdomen soft, fundus firm at level of umbilicus, nontender. Incision dry and clean without erythema. Lower extremities are negative for swelling, cords or tenderness. Labs:   Recent Results (from the past 24 hour(s))   GLUCOSE, POC    Collection Time: 20  8:03 AM   Result Value Ref Range    Glucose (POC) 121 (H) 65 - 100 mg/dL    Performed by Rob Solitario (PCT)    GLUCOSE, POC    Collection Time: 20  2:27 PM   Result Value Ref Range    Glucose (POC) 170 (H) 65 - 100 mg/dL    Performed by Rob Solitario (PCT)    CBC WITH AUTOMATED DIFF    Collection Time: 20  2:51 AM   Result Value Ref Range    WBC 12.4 (H) 3.6 - 11.0 K/uL    RBC 3.17 (L) 3.80 - 5.20 M/uL    HGB 10.2 (L) 11.5 - 16.0 g/dL    HCT 30.9 (L) 35.0 - 47.0 %    MCV 97.5 80.0 - 99.0 FL    MCH 32.2 26.0 - 34.0 PG    MCHC 33.0 30.0 - 36.5 g/dL    RDW 13.5 11.5 - 14.5 %    PLATELET 989 640 - 097 K/uL    MPV 10.9 8.9 - 12.9 FL    NRBC 0.0 0  WBC    ABSOLUTE NRBC 0.00 0.00 - 0.01 K/uL    NEUTROPHILS 80 (H) 32 - 75 %    LYMPHOCYTES 12 12 - 49 %    MONOCYTES 6 5 - 13 %    EOSINOPHILS 1 0 - 7 %    BASOPHILS 0 0 - 1 %    IMMATURE GRANULOCYTES 0 0.0 - 0.5 %    ABS. NEUTROPHILS 9.9 (H) 1.8 - 8.0 K/UL    ABS. LYMPHOCYTES 1.5 0.8 - 3.5 K/UL    ABS. MONOCYTES 0.8 0.0 - 1.0 K/UL    ABS. EOSINOPHILS 0.1 0.0 - 0.4 K/UL    ABS.  BASOPHILS 0.0 0.0 - 0.1 K/UL    ABS. IMM. GRANS. 0.1 (H) 0.00 - 0.04 K/UL    DF AUTOMATED         Assessment and Plan:  Patient appears to be having uncomplicated post- course. Continue routine post-op care and maternal education.

## 2020-03-20 VITALS
OXYGEN SATURATION: 98 % | BODY MASS INDEX: 35.7 KG/M2 | TEMPERATURE: 98.6 F | DIASTOLIC BLOOD PRESSURE: 68 MMHG | HEIGHT: 62 IN | HEART RATE: 67 BPM | RESPIRATION RATE: 16 BRPM | SYSTOLIC BLOOD PRESSURE: 112 MMHG | WEIGHT: 194 LBS

## 2020-03-20 PROCEDURE — 74011250637 HC RX REV CODE- 250/637: Performed by: OBSTETRICS & GYNECOLOGY

## 2020-03-20 RX ADMIN — OXYCODONE HYDROCHLORIDE AND ACETAMINOPHEN 1 TABLET: 5; 325 TABLET ORAL at 01:46

## 2020-03-20 RX ADMIN — Medication 1 TABLET: at 08:00

## 2020-03-20 RX ADMIN — OXYCODONE HYDROCHLORIDE AND ACETAMINOPHEN 1 TABLET: 5; 325 TABLET ORAL at 05:37

## 2020-03-20 RX ADMIN — SIMETHICONE CHEW TAB 80 MG 80 MG: 80 TABLET ORAL at 13:35

## 2020-03-20 RX ADMIN — IBUPROFEN 800 MG: 800 TABLET ORAL at 13:35

## 2020-03-20 RX ADMIN — OXYCODONE HYDROCHLORIDE AND ACETAMINOPHEN 1 TABLET: 5; 325 TABLET ORAL at 13:35

## 2020-03-20 RX ADMIN — SIMETHICONE CHEW TAB 80 MG 80 MG: 80 TABLET ORAL at 08:00

## 2020-03-20 RX ADMIN — METFORMIN HYDROCHLORIDE 500 MG: 500 TABLET ORAL at 13:35

## 2020-03-20 RX ADMIN — DOCUSATE SODIUM 100 MG: 100 CAPSULE, LIQUID FILLED ORAL at 08:00

## 2020-03-20 RX ADMIN — METFORMIN HYDROCHLORIDE 500 MG: 500 TABLET ORAL at 08:02

## 2020-03-20 RX ADMIN — IBUPROFEN 800 MG: 800 TABLET ORAL at 05:37

## 2020-03-20 NOTE — LACTATION NOTE
This note was copied from a baby's chart. LC in round on mother. Mother states she would like to nap. She has been formula feeding but listed as breast and formula feeding.

## 2020-03-20 NOTE — PROGRESS NOTES
Post-Operative  Day [address] [facility]         Information for the patient's :  Johnathon Macdonald Male [[id number]]   , Low Transverse   Patient doing well without significant complaint. Tolerating diet, passing flatus, voiding and ambulating without difficulty    Vitals:  Visit Vitals  /68 (BP 1 Location: Right arm, BP Patient Position: At rest)   Pulse 67   Temp 98.6 °F (37 °C)   Resp 16   Ht 5' 2\" (1.575 m)   Wt 194 lb (88 kg)   LMP 2019 (Exact Date)   SpO2 98%   Breastfeeding Unknown   BMI 35.48 kg/m²     Temp (24hrs), Av.3 °F (36.8 °C), Min:98 °F (36.7 °C), Max:98.6 °F (37 °C)        Exam:        Patient without distress. Abdomen, bowel sounds present, soft, expected tenderness, fundus firm                Wound incision clean, dry and intact               Lower extremities are negative for swelling, cords or tenderness. Labs:   Lab Results   Component Value Date/Time    WBC 12.4 (H) 2020 02:51 AM    WBC 15.4 (H) 2020 09:52 PM    WBC 10.4 2020 12:30 PM    WBC 9.5 2019 01:11 PM    HGB 10.2 (L) 2020 02:51 AM    HGB 13.8 2020 09:52 PM    HGB 13.4 2020 12:30 PM    HGB 14.6 2019 01:11 PM    HCT 30.9 (L) 2020 02:51 AM    HCT 39.7 2020 09:52 PM    HCT 40.7 2020 12:30 PM    HCT 42.3 2019 01:11 PM    PLATELET 051  02:51 AM    PLATELET 761  09:52 PM    PLATELET 091 87/10/4819 12:30 PM    PLATELET 914  01:11 PM       Recent Results (from the past 24 hour(s))   POST EXPOSURE PROFILE    Collection Time: 20  9:50 AM   Result Value Ref Range    p24 Antigen NONREACTIVE NR      HIV-1,2 Ab NONREACTIVE NR      Hep C  virus Ab Interp. NONREACTIVE NR      Hep C  virus Ab comment Method used is Siemens Advia Centaur      Hepatitis B surface Ag <0.10 Index    Hep B surface Ag Interp. NEGATIVE  NEG         Assessment: Post-Op day 3, doing well    Plan:   1. Discharge home today  2. Follow up in office in 6 weeks with Terri Lino MD  3. Post partum activity/wound care advised, diet as tolerated  4.  Discharge Medications: ibuprofen, percocet and medications prior to admission

## 2020-03-20 NOTE — PROGRESS NOTES
Patient discharged to home. Education completed. Patient reported she had no more questions. Bands verified on patient and infant, see footprint sheet. Infant placed in carseat by parent.   Prescriptions:Motrin and Percocet called into pharmacy by MD.

## 2020-03-20 NOTE — ROUTINE PROCESS
Bedside and Verbal shift change report given to Michael Pastor RN (oncoming nurse) by JAQUAN Tovar RN (offgoing nurse). Report included the following information SBAR, Kardex, Procedure Summary, Intake/Output, MAR and Recent Results.  \

## 2020-03-20 NOTE — DISCHARGE INSTRUCTIONS
POST DELIVERY DISCHARGE INSTRUCTIONS    Name: Wagner Connors  YOB: 1985  Primary Diagnosis: Active Problems:    Pregnancy (3/17/2020)        General:     Diet/Diet Restrictions:  Eight 8-ounce glasses of fluid daily (water, juices); avoid excessive caffeine intake. Meals/snacks as desired which are high in fiber and carbohydrates and low in fat and cholesterol. Medications:   {Medication reconciliation information is now added to the patient's AVS automatically when it is printed. There is no need to use this SmartLink in discharge instructions. Highlight this text and delete it to clear this message}      Physical Activity / Restrictions / Safety:     Avoid heavy lifting, no more that 8 lbs. For 2-3 weeks; No driving while taking narcotic pain medication. Post  patients should not drive until pain free. No intercourse 4-6 weeks, no douching or tampon use. May resume exercise in 6 weeks. Discharge Instructions/Special Treatment/Home Care Needs:     Continue prenatal vitamins. Continue to use squirt bottle with warm water on your episiotomy after each bathroom use until bleeding stops. If steri-strips applied to your incision, remove in 7 days. Take stool softeners daily. Call your doctor for the following:     Fever over 101 degrees by mouth. Vaginal bleeding heavier than a normal menstrual period or lost larger than a golf ball. Red streaks or increased swelling of legs, painful red streaks on your breast.  Painful urination, or increased pain, redness or discharge with your incision. Pain Management:     Pain Management:   Take Acetaminophen (Tylenol) or Ibuprofen (Advil, Motrin), as directed for pain. Use a warm Sitz bath 3 times daily to relieve episiotomy or hemorrhoidal discomfort. Heating pad to  incision as needed. For hemorrhoidal discomfort, use Tucks and Anusol cream as needed and directed.     Follow-Up Care:     Appointment with MD:   Follow-up Appointments   Procedures    FOLLOW UP VISIT Appointment in: 6 Weeks     Standing Status:   Standing     Number of Occurrences:   1     Order Specific Question:   Appointment in     Answer:   6 Weeks     Telephone number: [phone]    Signed By: Vadim Pacheco MD                                                                                                   Date: 3/19/2020 Time: 8:24 AM

## 2020-03-20 NOTE — LACTATION NOTE
This note was copied from a baby's chart. Mother and baby for discharge today. Mother states baby will not take breast so she has been formula feeding. She is considering pumping once home. Mother given a hand pump for home use. Reviewed storage and preparation of expressed breast milk. LC also discussed the following:    Timing of feedings, what to expect re: baby's output. Pump Q 2-3 hr and on demand. Discussed eating a healthy diet. Instructed mother to eat a variety of foods in order to get a well balanced diet. She should consume an extra 500 calories per day (more than her non-pregnant requirement.) These extra calories will help provide energy needed for optimal breast milk production. Mother also encouraged to \"drink to thirst\" and it is recommended that she drink fluids such as water, fruit/vegetable juice. Nutritious snacks should be available so that she can eat throughout the day to help satisfy her hunger and maintain a good milk supply. Engorgement Care Guidelines:  Reviewed how milk is made and normal phases of milk production. Taught care of engorged breasts - frequent breastfeeding encouraged, cool packs and motrin as tolerated. Anticipatory guidance shared. Breast Assessment  Left Breast: Medium  Left Nipple: Everted, Intact, Short  Right Breast: Medium  Right Nipple: Everted, Intact, Short  Breast- Feeding Assessment  Attends Breast-Feeding Classes: No  Breast-Feeding Experience: Yes  Breast Trauma/Surgery: No  Type/Quality: Attempted  Lactation Consultant Visits  Breast-Feedings: Not breast-feeding(Mother states baby will not take breast when offered so she is formula feeding. Mother and baby for D/C - she is considering pumping once home. Hand pump given for home use. )      Instructed mother to call [facility] for any breastfeeding or pumping concerns.  Mother has LC#/

## 2020-04-28 ENCOUNTER — OFFICE VISIT (OUTPATIENT)
Dept: OBGYN CLINIC | Age: 35
End: 2020-04-28

## 2020-04-28 VITALS — DIASTOLIC BLOOD PRESSURE: 82 MMHG | SYSTOLIC BLOOD PRESSURE: 135 MMHG | BODY MASS INDEX: 31.28 KG/M2 | WEIGHT: 171 LBS

## 2020-04-28 PROBLEM — Z34.90 PREGNANCY: Status: RESOLVED | Noted: 2020-03-17 | Resolved: 2020-04-28

## 2020-04-28 PROBLEM — Z34.80 SUPERVISION OF OTHER NORMAL PREGNANCY: Status: RESOLVED | Noted: 2019-09-23 | Resolved: 2020-04-28

## 2021-06-18 ENCOUNTER — APPOINTMENT (OUTPATIENT)
Dept: GENERAL RADIOLOGY | Age: 36
End: 2021-06-18
Attending: NURSE PRACTITIONER
Payer: COMMERCIAL

## 2021-06-18 ENCOUNTER — HOSPITAL ENCOUNTER (EMERGENCY)
Age: 36
Discharge: HOME | End: 2021-06-18
Attending: EMERGENCY MEDICINE
Payer: COMMERCIAL

## 2021-06-18 VITALS
HEART RATE: 80 BPM | OXYGEN SATURATION: 99 % | TEMPERATURE: 98.5 F | SYSTOLIC BLOOD PRESSURE: 137 MMHG | BODY MASS INDEX: 34.04 KG/M2 | RESPIRATION RATE: 16 BRPM | HEIGHT: 62 IN | WEIGHT: 185 LBS | DIASTOLIC BLOOD PRESSURE: 86 MMHG

## 2021-06-18 DIAGNOSIS — S93.402A SPRAIN OF LEFT ANKLE, UNSPECIFIED LIGAMENT, INITIAL ENCOUNTER: Primary | ICD-10-CM

## 2021-06-18 DIAGNOSIS — M25.572 ACUTE LEFT ANKLE PAIN: ICD-10-CM

## 2021-06-18 PROCEDURE — 99283 EMERGENCY DEPT VISIT LOW MDM: CPT

## 2021-06-18 PROCEDURE — 73610 X-RAY EXAM OF ANKLE: CPT

## 2021-06-18 RX ORDER — IBUPROFEN 800 MG/1
800 TABLET ORAL
Qty: 20 TABLET | Refills: 0 | Status: SHIPPED | OUTPATIENT
Start: 2021-06-18

## 2021-06-18 NOTE — Clinical Note
[address]  [facility] EMERGENCY DEPT  [address] [id number]  [phone]    Work/School Note    Date: 6/18/2021    To Whom It May concern:    Melissa Kelly was seen and treated today in the emergency room by the following provider(s):  Attending Provider: Ness Vanegas MD  Nurse Practitioner: Amari Xie NP. Melissa Kelly is excused from work/school on 6/18/2021 through 6/21/2021. She is medically clear to return to work/school on 6/22/2021.         Sincerely,          Ilana Mendoza NP

## 2021-06-19 NOTE — ED PROVIDER NOTES
This is a 78-year-old female who presents ambulatory with assistance to the emergency room with complaints of left ankle pain. Patient states she fell on Sunday injuring her ankle. States that she has positive swelling and pain at the actual site. Can ambulate only with assistance of her . Patient states the pain is 8 out of 10 and located in the left ankle, now with worsening swelling and bruising. Patient has rested, iced and elevated with no relief of her symptoms. Comes to the emergency room to rule out a fracture. Patient denies hitting her head, no loss of consciousness. Patient is not on blood thinners. There are no further complaints at this time. None  Past Medical History:  No date: Gestational diabetes  No date: Gestational diabetes mellitus (GDM)  No past surgical history on file. Past Medical History:   Diagnosis Date    Gestational diabetes     Gestational diabetes mellitus (GDM)        No past surgical history on file.       Family History:   Problem Relation Age of Onset    Hypertension Mother     Diabetes Mother        Social History     Socioeconomic History    Marital status:      Spouse name: Not on file    Number of children: Not on file    Years of education: Not on file    Highest education level: Not on file   Occupational History    Not on file   Tobacco Use    Smoking status: Never Smoker    Smokeless tobacco: Never Used   Substance and Sexual Activity    Alcohol use: Never    Drug use: Never    Sexual activity: Yes     Partners: Male     Birth control/protection: None   Other Topics Concern     Service Not Asked    Blood Transfusions Not Asked    Caffeine Concern Not Asked    Occupational Exposure Not Asked    Hobby Hazards Not Asked    Sleep Concern Not Asked    Stress Concern Not Asked    Weight Concern Not Asked    Special Diet Not Asked    Back Care Not Asked    Exercise Not Asked    Bike Helmet Not Asked    Seat Belt Not Asked    Self-Exams Not Asked   Social History Narrative    Not on file     Social Determinants of Health     Financial Resource Strain:     Difficulty of Paying Living Expenses:    Food Insecurity:     Worried About Running Out of Food in the Last Year:     [address] in the Last Year:    Transportation Needs:     Lack of Transportation (Medical):  Lack of Transportation (Non-Medical):    Physical Activity:     Days of Exercise per Week:     Minutes of Exercise per Session:    Stress:     Feeling of Stress :    Social Connections:     Frequency of Communication with Friends and Family:     Frequency of Social Gatherings with Friends and Family:     Attends Orthodoxy Services:     Active Member of Clubs or Organizations:     Attends Club or Organization Meetings:     Marital Status:    Intimate Partner Violence:     Fear of Current or Ex-Partner:     Emotionally Abused:     Physically Abused:     Sexually Abused: ALLERGIES: Patient has no known allergies. Review of Systems   Constitutional: Negative for appetite change, chills, diaphoresis, fatigue and fever. HENT: Negative for congestion, ear discharge, ear pain, sinus pressure, sinus pain, sore throat and trouble swallowing. Eyes: Negative for photophobia, pain, redness and visual disturbance. Respiratory: Negative for chest tightness, shortness of breath and wheezing. Cardiovascular: Negative for chest pain and palpitations. Gastrointestinal: Negative for abdominal distention, abdominal pain, nausea and vomiting. Endocrine: Negative. Genitourinary: Negative for difficulty urinating, flank pain, frequency and urgency. Musculoskeletal: Positive for arthralgias (left ankle) and joint swelling (left ankle). Negative for back pain, neck pain and neck stiffness. Skin: Positive for color change (bruising to left ankle and foot). Negative for pallor, rash and wound. Allergic/Immunologic: Negative. Neurological: Negative for dizziness, speech difficulty, weakness and headaches. Hematological: Does not bruise/bleed easily. Psychiatric/Behavioral: Negative for behavioral problems. The patient is not nervous/anxious. Vitals:    06/18/21 1512   BP: 137/86   Pulse: 80   Resp: 16   Temp: 98.5 °F (36.9 °C)   SpO2: 99%   Weight: 83.9 kg (185 lb)   Height: 5' 2\" (1.575 m)            Physical Exam  Vitals and nursing note reviewed. Constitutional:       General: She is not in acute distress. Appearance: Normal appearance. She is well-developed. She is not ill-appearing. HENT:      Head: Normocephalic and atraumatic. Right Ear: External ear normal.      Left Ear: External ear normal.      Nose: Nose normal.      Mouth/Throat:      Mouth: Mucous membranes are moist.   Eyes:      General:         Right eye: No discharge. Left eye: No discharge. Conjunctiva/sclera: Conjunctivae normal.      Pupils: Pupils are equal, round, and reactive to light. Neck:      Vascular: No JVD. Trachea: No tracheal deviation. Cardiovascular:      Rate and Rhythm: Normal rate. Pulses: Normal pulses. Pulmonary:      Effort: Pulmonary effort is normal. No respiratory distress. Abdominal:      General: There is no distension. Genitourinary:     Comments: Deferred    Musculoskeletal:         General: Swelling (left ankle), tenderness (left ankle) and signs of injury (left ankle) present. Cervical back: Normal range of motion and neck supple. Comments: Range of motion to the left ankle limited by pain. Positive palpable pulses. Good capillary refill. No loss of motor or sensation. Skin:     General: Skin is warm and dry. Capillary Refill: Capillary refill takes less than 2 seconds. Coloration: Skin is not pale. Findings: No erythema or rash. Neurological:      Mental Status: She is alert and oriented to person, place, and time. Motor: No weakness. Coordination: Coordination normal.   Psychiatric:         Mood and Affect: Mood normal.         Behavior: Behavior normal.         Thought Content: Thought content normal.         Judgment: Judgment normal.          MDM  Number of Diagnoses or Management Options  Acute left ankle pain: new and requires workup  Sprain of left ankle, unspecified ligament, initial encounter: new and requires workup  Diagnosis management comments: Differential diagnosis includes ankle sprain, ankle fracture, foot fracture and others. Discharged home and follow-up with PCP. Follow-up with orthopedics as an outpatient. Return to the emergency room with worsening symptoms. Rest, ice, elevate. Motrin for pain. Patient in agreement with plan of care. Amount and/or Complexity of Data Reviewed  Tests in the radiology section of CPT®: ordered and reviewed         16:00  Pt has been reexamined. Pt has no new complaints, changes or physical findings. Care plan outlined and precautions discussed. All available results were reviewed with pt. All medications were reviewed with pt. All of pt's questions and concerns were addressed. Pt agrees to F/U as instructed and agrees to return to ED upon further deterioration. Pt is ready to go home. Dominik Bay NP    Please note that this dictation was completed with [employer], the [employer] voice recognition software. Quite often unanticipated grammatical, syntax, homophones, and other interpretive errors are inadvertently transcribed by the computer software. Please disregard these errors. Please excuse any errors that have escaped final proofreading. Thank you.     Procedures

## 2023-03-21 ENCOUNTER — HOSPITAL ENCOUNTER (EMERGENCY)
Age: 38
Discharge: HOME | End: 2023-03-21
Attending: EMERGENCY MEDICINE
Payer: COMMERCIAL

## 2023-03-21 VITALS
HEART RATE: 71 BPM | TEMPERATURE: 97.9 F | HEIGHT: 62 IN | RESPIRATION RATE: 18 BRPM | WEIGHT: 185 LBS | SYSTOLIC BLOOD PRESSURE: 181 MMHG | DIASTOLIC BLOOD PRESSURE: 94 MMHG | BODY MASS INDEX: 34.04 KG/M2 | OXYGEN SATURATION: 95 %

## 2023-03-21 DIAGNOSIS — R73.9 HYPERGLYCEMIA: Primary | ICD-10-CM

## 2023-03-21 LAB
ALBUMIN SERPL-MCNC: 3.7 G/DL (ref 3.5–5)
ALBUMIN/GLOB SERPL: 0.8 (ref 1.1–2.2)
ALP SERPL-CCNC: 144 U/L (ref 45–117)
ALT SERPL-CCNC: 56 U/L (ref 12–78)
ANION GAP SERPL CALC-SCNC: 3 MMOL/L (ref 5–15)
APPEARANCE UR: CLEAR
AST SERPL-CCNC: 19 U/L (ref 15–37)
BACTERIA URNS QL MICRO: NEGATIVE /HPF
BASE EXCESS BLDV CALC-SCNC: 0.9 MMOL/L
BASOPHILS # BLD: 0.1 K/UL (ref 0–0.1)
BASOPHILS NFR BLD: 1 % (ref 0–1)
BILIRUB SERPL-MCNC: 0.5 MG/DL (ref 0.2–1)
BILIRUB UR QL: NEGATIVE
BODY TEMPERATURE: 98
BUN SERPL-MCNC: 9 MG/DL (ref 6–20)
BUN/CREAT SERPL: 14 (ref 12–20)
CALCIUM SERPL-MCNC: 9.2 MG/DL (ref 8.5–10.1)
CHLORIDE SERPL-SCNC: 100 MMOL/L (ref 97–108)
CO2 SERPL-SCNC: 30 MMOL/L (ref 21–32)
COLOR UR: ABNORMAL
COMMENT, HOLDF: NORMAL
CREAT SERPL-MCNC: 0.65 MG/DL (ref 0.55–1.02)
DIFFERENTIAL METHOD BLD: NORMAL
EOSINOPHIL # BLD: 0.1 K/UL (ref 0–0.4)
EOSINOPHIL NFR BLD: 1 % (ref 0–7)
EPITH CASTS URNS QL MICRO: ABNORMAL /LPF
ERYTHROCYTE [DISTWIDTH] IN BLOOD BY AUTOMATED COUNT: 12.1 % (ref 11.5–14.5)
GLOBULIN SER CALC-MCNC: 4.5 G/DL (ref 2–4)
GLUCOSE BLD STRIP.AUTO-MCNC: 297 MG/DL (ref 65–117)
GLUCOSE SERPL-MCNC: 340 MG/DL (ref 65–100)
GLUCOSE UR STRIP.AUTO-MCNC: 500 MG/DL
HCG UR QL: NEGATIVE
HCO3 BLDV-SCNC: 26.2 MMOL/L (ref 23–28)
HCT VFR BLD AUTO: 40.9 % (ref 35–47)
HGB BLD-MCNC: 14.5 G/DL (ref 11.5–16)
HGB UR QL STRIP: NEGATIVE
IMM GRANULOCYTES # BLD AUTO: 0 K/UL (ref 0–0.04)
IMM GRANULOCYTES NFR BLD AUTO: 0 % (ref 0–0.5)
KETONES UR QL STRIP.AUTO: NEGATIVE MG/DL
LEUKOCYTE ESTERASE UR QL STRIP.AUTO: NEGATIVE
LYMPHOCYTES # BLD: 3.2 K/UL (ref 0.8–3.5)
LYMPHOCYTES NFR BLD: 35 % (ref 12–49)
MCH RBC QN AUTO: 31.7 PG (ref 26–34)
MCHC RBC AUTO-ENTMCNC: 35.5 G/DL (ref 30–36.5)
MCV RBC AUTO: 89.5 FL (ref 80–99)
MONOCYTES # BLD: 0.5 K/UL (ref 0–1)
MONOCYTES NFR BLD: 5 % (ref 5–13)
NEUTS SEG # BLD: 5.2 K/UL (ref 1.8–8)
NEUTS SEG NFR BLD: 58 % (ref 32–75)
NITRITE UR QL STRIP.AUTO: NEGATIVE
NRBC # BLD: 0 K/UL (ref 0–0.01)
NRBC BLD-RTO: 0 PER 100 WBC
PCO2 BLDV: 42.8 MMHG (ref 41–51)
PH BLDV: 7.39 (ref 7.32–7.42)
PH UR STRIP: 7 (ref 5–8)
PLATELET # BLD AUTO: 248 K/UL (ref 150–400)
PMV BLD AUTO: 10.3 FL (ref 8.9–12.9)
PO2 BLDV: 30 MMHG (ref 25–40)
POTASSIUM SERPL-SCNC: 3.7 MMOL/L (ref 3.5–5.1)
PROT SERPL-MCNC: 8.2 G/DL (ref 6.4–8.2)
PROT UR STRIP-MCNC: NEGATIVE MG/DL
RBC # BLD AUTO: 4.57 M/UL (ref 3.8–5.2)
RBC #/AREA URNS HPF: ABNORMAL /HPF (ref 0–5)
SAMPLES BEING HELD,HOLD: NORMAL
SAO2 % BLDV: 56.7 % (ref 65–88)
SERVICE CMNT-IMP: ABNORMAL
SERVICE CMNT-IMP: ABNORMAL
SODIUM SERPL-SCNC: 133 MMOL/L (ref 136–145)
SP GR UR REFRACTOMETRY: <1.005 (ref 1–1.03)
SPECIMEN TYPE: ABNORMAL
TROPONIN I SERPL HS-MCNC: <4 NG/L (ref 0–51)
UR CULT HOLD, URHOLD: NORMAL
UROBILINOGEN UR QL STRIP.AUTO: 0.2 EU/DL (ref 0.2–1)
WBC # BLD AUTO: 9.1 K/UL (ref 3.6–11)
WBC URNS QL MICRO: ABNORMAL /HPF (ref 0–4)

## 2023-03-21 PROCEDURE — 74011250636 HC RX REV CODE- 250/636: Performed by: EMERGENCY MEDICINE

## 2023-03-21 PROCEDURE — 85025 COMPLETE CBC W/AUTO DIFF WBC: CPT

## 2023-03-21 PROCEDURE — 82803 BLOOD GASES ANY COMBINATION: CPT

## 2023-03-21 PROCEDURE — 36415 COLL VENOUS BLD VENIPUNCTURE: CPT

## 2023-03-21 PROCEDURE — 96361 HYDRATE IV INFUSION ADD-ON: CPT

## 2023-03-21 PROCEDURE — 81025 URINE PREGNANCY TEST: CPT

## 2023-03-21 PROCEDURE — 80053 COMPREHEN METABOLIC PANEL: CPT

## 2023-03-21 PROCEDURE — 84484 ASSAY OF TROPONIN QUANT: CPT

## 2023-03-21 PROCEDURE — 82962 GLUCOSE BLOOD TEST: CPT

## 2023-03-21 PROCEDURE — 99284 EMERGENCY DEPT VISIT MOD MDM: CPT

## 2023-03-21 PROCEDURE — 93005 ELECTROCARDIOGRAM TRACING: CPT

## 2023-03-21 PROCEDURE — 81001 URINALYSIS AUTO W/SCOPE: CPT

## 2023-03-21 PROCEDURE — 96374 THER/PROPH/DIAG INJ IV PUSH: CPT

## 2023-03-21 RX ORDER — ONDANSETRON 2 MG/ML
4 INJECTION INTRAMUSCULAR; INTRAVENOUS
Status: COMPLETED | OUTPATIENT
Start: 2023-03-21 | End: 2023-03-21

## 2023-03-21 RX ORDER — METFORMIN HYDROCHLORIDE 500 MG/1
500 TABLET ORAL 2 TIMES DAILY WITH MEALS
Qty: 28 TABLET | Refills: 0 | Status: SHIPPED | OUTPATIENT
Start: 2023-03-21 | End: 2023-04-04

## 2023-03-21 RX ADMIN — ONDANSETRON 4 MG: 2 INJECTION INTRAMUSCULAR; INTRAVENOUS at 22:24

## 2023-03-21 RX ADMIN — SODIUM CHLORIDE 1000 ML: 9 INJECTION, SOLUTION INTRAVENOUS at 22:23

## 2023-03-22 NOTE — ED NOTES
Discussed medications, follow up plan, provided list of primary care providers, indications to return. Pt verbalized understanding.

## 2023-03-22 NOTE — DISCHARGE INSTRUCTIONS
Return to the emergency department with any new or worsening symptoms. In the meantime please take the metformin as directed. I have given you numbers to call multiple doctors offices if you do not already have a primary care doctor.

## 2023-03-22 NOTE — ED TRIAGE NOTES
Pt ambulatory to ED for elevated BS. Reports this morning has fatigue and dizziness. She took her blood sugar and was in 200's. This evening she took again and was in 300's. Pt does not have known personal hx of DM. Reports increased thirst and urination for months.

## 2023-03-22 NOTE — ED PROVIDER NOTES
HPI   33-year-old female presents to the emergency department due to concern for hyperglycemia. She woke up in the morning and generally did not feel well. She says her body was heavy and she also felt lightheaded. She felt very thirsty. She has a history of gestational diabetes so has a glucometer at home and checked her blood sugar and it was well over 200. She called her sister told her to take a walk. Then she rechecked it and it was over 300. She says she does not have a history of diabetes outside of pregnancy. She endorses polyuria and polydipsia for over a month now. She does state she had some chest pain today. No fevers or chills. No nausea vomiting or diarrhea. No shortness of breath. Past Medical History:   Diagnosis Date    Gestational diabetes     Gestational diabetes mellitus (GDM)        No past surgical history on file.       Family History:   Problem Relation Age of Onset    Hypertension Mother     Diabetes Mother        Social History     Socioeconomic History    Marital status:      Spouse name: Not on file    Number of children: Not on file    Years of education: Not on file    Highest education level: Not on file   Occupational History    Not on file   Tobacco Use    Smoking status: Never    Smokeless tobacco: Never   Substance and Sexual Activity    Alcohol use: Never    Drug use: Never    Sexual activity: Yes     Partners: Male     Birth control/protection: None   Other Topics Concern     Service Not Asked    Blood Transfusions Not Asked    Caffeine Concern Not Asked    Occupational Exposure Not Asked    Hobby Hazards Not Asked    Sleep Concern Not Asked    Stress Concern Not Asked    Weight Concern Not Asked    Special Diet Not Asked    Back Care Not Asked    Exercise Not Asked    Bike Helmet Not Asked    Seat Belt Not Asked    Self-Exams Not Asked   Social History Narrative    Not on file     Social Determinants of Health     Financial Resource Strain: Not on file Food Insecurity: Not on file   Transportation Needs: Not on file   Physical Activity: Not on file   Stress: Not on file   Social Connections: Not on file   Intimate Partner Violence: Not on file   Housing Stability: Not on file         ALLERGIES: Patient has no known allergies. Review of Systems  A complete review of systems was performed and all systems reviewed are negative unless otherwise documented in the HPI  Vitals:    03/21/23 2144   BP: (!) 181/94   Pulse: 71   Resp: 18   Temp: 97.9 °F (36.6 °C)   SpO2: 95%   Weight: 83.9 kg (185 lb)   Height: 5' 2\" (1.575 m)            Physical Exam  Constitutional:       Comments: Not acutely distressed or ill-appearing   HENT:      Mouth/Throat:      Comments: Moist mucous membranes  Eyes:      General: No scleral icterus. Extraocular Movements: Extraocular movements intact. Neck:      Comments: Trachea midline  Cardiovascular:      Rate and Rhythm: Normal rate and regular rhythm. Heart sounds: No murmur heard. Pulmonary:      Effort: Pulmonary effort is normal. No respiratory distress. Breath sounds: Normal breath sounds. No wheezing or rales. Abdominal:      General: There is no distension. Palpations: Abdomen is soft. Tenderness: There is no abdominal tenderness. Musculoskeletal:         General: No deformity. Normal range of motion. Cervical back: Normal range of motion. Skin:     General: Skin is warm and dry. Neurological:      Comments: Awake and alert. GCS 15        Medical Decision Making  Amount and/or Complexity of Data Reviewed  Labs: ordered. ECG/medicine tests: ordered. Risk  Prescription drug management. 38-year-old woman presenting with the above chief complaint. Vital signs notable for a bit of hypertension. Blood sugar 297 here. Nontoxic-appearing on exam.  Labs will be ordered to assess for DKA, though the patient does not look like it clinically.   She will be given some IV fluids and reassessed. Patient has a normal pH on venous blood gas. CBC unremarkable. No sign of infection on UA. No metabolic acidosis or anion gap on CMP. Troponin negative. Patient provided with a prescription for metformin was given multiple numbers to establish care with a PCP.   She was discharged in stable condition       Procedures

## 2023-03-24 LAB
ATRIAL RATE: 72 BPM
CALCULATED P AXIS, ECG09: 38 DEGREES
CALCULATED R AXIS, ECG10: 11 DEGREES
CALCULATED T AXIS, ECG11: 29 DEGREES
DIAGNOSIS, 93000: NORMAL
P-R INTERVAL, ECG05: 124 MS
Q-T INTERVAL, ECG07: 432 MS
QRS DURATION, ECG06: 76 MS
QTC CALCULATION (BEZET), ECG08: 473 MS
VENTRICULAR RATE, ECG03: 72 BPM

## 2023-04-04 ENCOUNTER — OFFICE VISIT (OUTPATIENT)
Dept: FAMILY MEDICINE CLINIC | Age: 38
End: 2023-04-04
Payer: COMMERCIAL

## 2023-04-04 PROBLEM — E11.65 TYPE 2 DIABETES MELLITUS WITH HYPERGLYCEMIA, WITHOUT LONG-TERM CURRENT USE OF INSULIN (HCC): Status: ACTIVE | Noted: 2023-04-04

## 2023-04-04 PROBLEM — I15.9 SECONDARY HYPERTENSION: Status: ACTIVE | Noted: 2023-04-04

## 2023-04-04 PROBLEM — E13.65 OTHER SPECIFIED DIABETES MELLITUS WITH HYPERGLYCEMIA (HCC): Status: ACTIVE | Noted: 2023-04-04

## 2023-04-04 PROCEDURE — 99205 OFFICE O/P NEW HI 60 MIN: CPT | Performed by: FAMILY MEDICINE

## 2023-04-04 RX ORDER — MULTIVITAMIN: 1 TABLET ORAL DAILY

## 2023-04-04 RX ORDER — LANCETS
EACH MISCELLANEOUS
Qty: 100 EACH | Refills: 3 | Status: SHIPPED
Start: 2023-04-04

## 2023-04-04 RX ORDER — SENNOSIDES/DOCUSATE SODIUM 8.6MG-50MG
TABLET ORAL
Qty: 100 STRIP | Refills: 3 | Status: SHIPPED
Start: 2023-04-04

## 2023-04-04 RX ORDER — SEMAGLUTIDE 0.25 MG/.5ML
INJECTION, SOLUTION SUBCUTANEOUS
Qty: 42 EACH | Refills: 0 | Status: SHIPPED
Start: 2023-04-04 | End: 2023-05-30

## 2023-04-04 RX ORDER — METFORMIN HYDROCHLORIDE 1000 MG/1
1000 TABLET ORAL 2 TIMES DAILY WITH MEALS
Qty: 180 TABLET | Refills: 3 | Status: SHIPPED
Start: 2023-04-04 | End: 2024-03-29

## 2023-04-04 RX ORDER — INSULIN PUMP SYRINGE, 3 ML
EACH MISCELLANEOUS
Qty: 1 KIT | Refills: 0 | Status: SHIPPED
Start: 2023-04-04

## 2023-04-04 NOTE — ASSESSMENT & PLAN NOTE
No signs of electrolyte abnormalities that point towards other secondary cause. Has had cardiac eval, and likely patient has had longstanding hypertension likely primary with obesity. We will discuss medication therapy at follow up visit, want to focus on diabetic medications at this time.

## 2023-04-04 NOTE — PROGRESS NOTES
Family Medicine Initial Office Visit  Patient: Jack Esposito  1985, 42 y.o., female  Encounter Date: 4/4/2023    ASSESSMENT & PLAN  Chronic Conditions Addressed Today       1. Other specified diabetes mellitus with hyperglycemia ([address] - Primary       unclear control, continue current plan pending work up below. May need to consider insulin management vs other oral medication if GLP-1 not covered or if A1c too high. Heavy lifestyle recommended and diabetic educator. Look for signs of Type 1 or pancreatic enzyme clues given sudden onset and weight loss. Relevant Medications     metFORMIN (GLUCOPHAGE) 1,000 mg tablet     Blood-Glucose Meter monitoring kit     lancets misc     glucose blood VI test strips (Advanced Gluc Meter Test Strip) strip     semaglutide, weight loss, (Wegovy) 0.25 mg/0.5 mL pnij     Other Relevant Orders     HEMOGLOBIN A1C WITH EAG     C-PEPTIDE     REFERRAL TO DIABETIC EDUCATION     METABOLIC PANEL, COMPREHENSIVE     LIPASE     AMYLASE    2. Secondary hypertension      No signs of electrolyte abnormalities that point towards other secondary cause. Has had cardiac eval, and likely patient has had longstanding hypertension likely primary with obesity. We will discuss medication therapy at follow up visit, want to focus on diabetic medications at this time.           Other Problems Addressed Today       Elevated serum alkaline phosphatase level            Relevant Orders        METABOLIC PANEL, COMPREHENSIVE    Elevated serum globulin level            Relevant Orders        METABOLIC PANEL, COMPREHENSIVE        PROTEIN ELECTROPHORESIS            Orders Placed This Encounter    HEMOGLOBIN A1C WITH EAG     Standing Status:   Future     Number of Occurrences:   1     Standing Expiration Date:   4/4/2024    C-PEPTIDE     Standing Status:   Future     Number of Occurrences:   1     Standing Expiration Date:   2/1/1756    METABOLIC PANEL, COMPREHENSIVE     Standing Status:   Future Number of Occurrences:   1     Standing Expiration Date:   2024    LIPASE     Standing Status:   Future     Number of Occurrences:   1     Standing Expiration Date:   2024    AMYLASE     Standing Status:   Future     Number of Occurrences:   1     Standing Expiration Date:   2024    PROTEIN ELECTROPHORESIS     Standing Status:   Future     Number of Occurrences:   1     Standing Expiration Date:   2024    REFERRAL TO DIABETIC EDUCATION     Standing Status:   Standing     Number of Occurrences:   5     Standing Expiration Date:   2024     Referral Priority:   Urgent     Referral Type:   Consultation     Referral Reason:   Specialty Services Required     Number of Visits Requested:   5    multivitamin (ONE A DAY) tablet     Sig: Take 1 Tablet by mouth daily. metFORMIN (GLUCOPHAGE) 1,000 mg tablet     Sig: Take 1 Tablet by mouth two (2) times daily (with meals) for 360 days. Dispense:  180 Tablet     Refill:  3    Blood-Glucose Meter monitoring kit     Sig: Please use to check your blood sugar every morning before eating, and if you feel you are feeling unwell. Dispense:  1 Kit     Refill:  0    lancets misc     Sig: For checking glucose daily. Clean area with alcohol pad and allow to dry before using     Dispense:  100 Each     Refill:  3    glucose blood VI test strips (Advanced Gluc Meter Test Strip) strip     Sig: For checking glucose daily. Dispense:  100 Strip     Refill:  3    semaglutide, weight loss, (Wegovy) 0.25 mg/0.5 mL pnij     Si.25 mg every seven (7) days for 28 days, THEN 0.5 mg every seven (7) days for 28 days. Indications: weight loss management for an obese person, diabetes mellitus type 2     Dispense:  42 Each     Refill:  0     There are no Patient Instructions on file for this visit.     CHIEF COMPLAINT  Chief Complaint   Patient presents with    New Patient    Memory Loss     Forgetting things and \"going blank and unable to be \"awakened\" when people call her name; blurred vision x 1 yr; gotten worse in last 4 months       SUBJECTIVE  Kelly Culp is a 42 y.o. female presenting today for establishing care. New diabetes diagnosis in ED 1.5 weeks ago. Exercise: nonspecific  Diet / Weight: not drinking sugary beverages. Just started cutting back heavily on rice-based carbohydrates and tortillas. Patient unsure of exact causes of dietary glucose. Review of Systems    Chronic Conditions Addressed Today       1. Other specified diabetes mellitus with hyperglycemia ([facility] [address] - Primary     Overview      Lab Results   Component Value Date/Time    Hemoglobin A1c (POC) 8.6 11/04/2019 12:02 PM     Lab Results   Component Value Date/Time    Glucose 340 (H) 03/21/2023 10:16 PM   Medications: metformin 500mg BID    Interval Hx: gestational diabetes back in 2020. Patient has family hx of diabetes type 2. Recent ED visit on 03/21 for 4 months of memory, foggy brain, with 2 months of blurred vision with polyuria/polydypsia. Denies abdominal pain, nausea, vomiting. 2. Secondary hypertension     Overview      No previous medications, no symptoms today though patient does get headaches and chest discomfort, evaluated in ER recently during chest discomfort, no EKG or troponin findings. Patient BP improved today further without intervention. Acute Diagnoses Addressed Today       Elevated serum alkaline phosphatase level            Relevant Orders        METABOLIC PANEL, COMPREHENSIVE    Elevated serum globulin level            Relevant Orders        METABOLIC PANEL, COMPREHENSIVE        PROTEIN ELECTROPHORESIS             OBJECTIVE  Visit Vitals  BP (!) 158/95   Pulse 81   Temp 97.2 °F (36.2 °C) (Temporal)   Resp 18   Ht 5' 2\" (1.575 m)   Wt 173 lb (78.5 kg)   SpO2 99%   BMI 31.64 kg/m²       Physical Exam  Vitals reviewed. Constitutional:       General: She is not in acute distress.      Appearance: Normal appearance. She is not ill-appearing. HENT:      Head: Normocephalic and atraumatic. Right Ear: Tympanic membrane, ear canal and external ear normal.      Left Ear: Tympanic membrane, ear canal and external ear normal.      Nose: Nose normal.      Mouth/Throat:      Mouth: Mucous membranes are moist.      Pharynx: Oropharynx is clear. No posterior oropharyngeal erythema. Eyes:      General:         Right eye: No discharge. Left eye: No discharge. Extraocular Movements: Extraocular movements intact. Conjunctiva/sclera: Conjunctivae normal.      Pupils: Pupils are equal, round, and reactive to light. Neck:      Vascular: No carotid bruit. Cardiovascular:      Rate and Rhythm: Normal rate and regular rhythm. Pulses: Normal pulses. Heart sounds: No murmur heard. No friction rub. No gallop. Pulmonary:      Effort: Pulmonary effort is normal. No respiratory distress. Breath sounds: Normal breath sounds. No wheezing, rhonchi or rales. Abdominal:      General: Bowel sounds are normal. There is no distension. Palpations: Abdomen is soft. Tenderness: There is no abdominal tenderness. There is no guarding. Musculoskeletal:         General: No swelling, tenderness, deformity or signs of injury. Normal range of motion. Cervical back: Normal range of motion. No rigidity or tenderness. Feet:      Right foot:      Protective Sensation: 10 sites tested. 10 sites sensed. Toenail Condition: Right toenails are normal.      Left foot:      Protective Sensation: 10 sites tested. 10 sites sensed. Toenail Condition: Left toenails are abnormally thick. Lymphadenopathy:      Cervical: No cervical adenopathy. Skin:     General: Skin is warm and dry. Capillary Refill: Capillary refill takes less than 2 seconds. Coloration: Skin is not jaundiced. Findings: No bruising, erythema or rash. Neurological:      General: No focal deficit present. Mental Status: She is alert and oriented to person, place, and time. Mental status is at baseline. Sensory: No sensory deficit. Motor: No weakness. Coordination: Coordination normal.   Psychiatric:         Mood and Affect: Mood normal.         Behavior: Behavior normal.         Thought Content: Thought content normal.         Judgment: Judgment normal.     No results found for any visits on 04/04/23.     HISTORICAL  Reviewed and updated today, and as noted below:    Past Medical History:   Diagnosis Date    Gestational diabetes     Gestational diabetes mellitus (GDM)     Secondary hypertension 4/4/2023     Past Surgical History:   Procedure Laterality Date    HX TUBAL LIGATION Bilateral 2021     Family History   Problem Relation Age of Onset    Hypertension Mother     Diabetes Mother      Social History     Tobacco Use   Smoking Status Never   Smokeless Tobacco Never     Social History     Socioeconomic History    Marital status:    Tobacco Use    Smoking status: Never    Smokeless tobacco: Never   Vaping Use    Vaping Use: Never used   Substance and Sexual Activity    Alcohol use: Never    Drug use: Never    Sexual activity: Yes     Partners: Male     Birth control/protection: None     No Known Allergies    Admission on 03/21/2023, Discharged on 03/21/2023   Component Date Value Ref Range Status    WBC 03/21/2023 9.1  3.6 - 11.0 K/uL Final    RBC 03/21/2023 4.57  3.80 - 5.20 M/uL Final    HGB 03/21/2023 14.5  11.5 - 16.0 g/dL Final    HCT 03/21/2023 40.9  35.0 - 47.0 % Final    MCV 03/21/2023 89.5  80.0 - 99.0 FL Final    MCH 03/21/2023 31.7  26.0 - 34.0 PG Final    MCHC 03/21/2023 35.5  30.0 - 36.5 g/dL Final    RDW 03/21/2023 12.1  11.5 - 14.5 % Final    PLATELET 41/50/7911 906  150 - 400 K/uL Final    MPV 03/21/2023 10.3  8.9 - 12.9 FL Final    NRBC 03/21/2023 0.0  0  WBC Final    ABSOLUTE NRBC 03/21/2023 0.00  0.00 - 0.01 K/uL Final    NEUTROPHILS 03/21/2023 58  32 - 75 % Final LYMPHOCYTES 03/21/2023 35  12 - 49 % Final    MONOCYTES 03/21/2023 5  5 - 13 % Final    EOSINOPHILS 03/21/2023 1  0 - 7 % Final    BASOPHILS 03/21/2023 1  0 - 1 % Final    IMMATURE GRANULOCYTES 03/21/2023 0  0.0 - 0.5 % Final    ABS. NEUTROPHILS 03/21/2023 5.2  1.8 - 8.0 K/UL Final    ABS. LYMPHOCYTES 03/21/2023 3.2  0.8 - 3.5 K/UL Final    ABS. MONOCYTES 03/21/2023 0.5  0.0 - 1.0 K/UL Final    ABS. EOSINOPHILS 03/21/2023 0.1  0.0 - 0.4 K/UL Final    ABS. BASOPHILS 03/21/2023 0.1  0.0 - 0.1 K/UL Final    ABS. IMM. GRANS. 03/21/2023 0.0  0.00 - 0.04 K/UL Final    DF 03/21/2023 AUTOMATED    Final    Sodium 03/21/2023 133 (A)  136 - 145 mmol/L Final    Potassium 03/21/2023 3.7  3.5 - 5.1 mmol/L Final    Chloride 03/21/2023 100  97 - 108 mmol/L Final    CO2 03/21/2023 30  21 - 32 mmol/L Final    Anion gap 03/21/2023 3 (A)  5 - 15 mmol/L Final    Glucose 03/21/2023 340 (A)  65 - 100 mg/dL Final    BUN 03/21/2023 9  6 - 20 MG/DL Final    Creatinine 03/21/2023 0.65  0.55 - 1.02 MG/DL Final    BUN/Creatinine ratio 03/21/2023 14  12 - 20   Final    eGFR 03/21/2023 >60  >60 ml/min/1.73m2 Final    Comment:      Pediatric calculator link: [email or web address].at. org/professionals/kdoqi/gfr_calculatorped       These results are not intended for use in patients <25years of age. eGFR results are calculated without a race factor using  the 2021 CKD-EPI equation. Careful clinical correlation is recommended, particularly when comparing to results calculated using previous equations. The CKD-EPI equation is less accurate in patients with extremes of muscle mass, extra-renal metabolism of creatinine, excessive creatine ingestion, or following therapy that affects renal tubular secretion. Calcium 03/21/2023 9.2  8.5 - 10.1 MG/DL Final    Bilirubin, total 03/21/2023 0.5  0.2 - 1.0 MG/DL Final    ALT (SGPT) 03/21/2023 56  12 - 78 U/L Final    AST (SGOT) 03/21/2023 19  15 - 37 U/L Final    Alk.  phosphatase 03/21/2023 144 (A)  45 - 117 U/L Final    Protein, total 03/21/2023 8.2  6.4 - 8.2 g/dL Final    Albumin 03/21/2023 3.7  3.5 - 5.0 g/dL Final    Globulin 03/21/2023 4.5 (A)  2.0 - 4.0 g/dL Final    A-G Ratio 03/21/2023 0.8 (A)  1.1 - 2.2   Final    Color 03/21/2023 YELLOW/STRAW    Final    Color Reference Range: Straw, Yellow or Dark Yellow    Appearance 03/21/2023 CLEAR  CLEAR   Final    Specific gravity 03/21/2023 <1.005  1.003 - 1.030 Final    pH (UA) 03/21/2023 7.0  5.0 - 8.0   Final    Protein 03/21/2023 Negative  NEG mg/dL Final    Glucose 03/21/2023 500 (A)  NEG mg/dL Final    Ketone 03/21/2023 Negative  NEG mg/dL Final    Bilirubin 03/21/2023 Negative  NEG   Final    Blood 03/21/2023 Negative  NEG   Final    Urobilinogen 03/21/2023 0.2  0.2 - 1.0 EU/dL Final    Nitrites 03/21/2023 Negative  NEG   Final    Leukocyte Esterase 03/21/2023 Negative  NEG   Final    WBC 03/21/2023 0-4  0 - 4 /hpf Final    RBC 03/21/2023 0-5  0 - 5 /hpf Final    Epithelial cells 03/21/2023 FEW  FEW /lpf Final    Epithelial cell category consists of squamous cells and /or transitional urothelial cells. Renal tubular cells, if present, are separately identified as such. Bacteria 03/21/2023 Negative  NEG /hpf Final    Urine culture hold 03/21/2023 Urine on hold in Microbiology dept for 2 days. If unpreserved urine is submitted, it cannot be used for addtional testing after 24 hours, recollection will be required. Final    Troponin-High Sensitivity 03/21/2023 <4  0 - 51 ng/L Final    Comment: A HS troponin value change of (+ or -) 50% or more below the 99th percentile, in a 1/2/3 hr interval represents a significant change. Clinical correlation is recommended. A HS troponin value change of (+ or -) 20% or above the 99th percentile, in a 1/2/3 hr interval represents a significant change. Clinical correlation is recommended.   99th Percentile:   Women: 0-51 ng/L                                                                Men:   0-76 ng/L  Patients taking more than 20 mg/day of biotin may have falsely negative results and should not use this test.      Ventricular Rate 03/21/2023 72  BPM Final    Atrial Rate 03/21/2023 72  BPM Final    P-R Interval 03/21/2023 124  ms Final    QRS Duration 03/21/2023 76  ms Final    Q-T Interval 03/21/2023 432  ms Final    QTC Calculation (Bezet) 03/21/2023 473  ms Final    Calculated P Axis 03/21/2023 38  degrees Final    Calculated R Axis 03/21/2023 11  degrees Final    Calculated T Axis 03/21/2023 29  degrees Final    Diagnosis 03/21/2023    Final                    Value:Normal sinus rhythm  Normal ECG  No previous ECGs available  Confirmed by [employer] ([id number]) on 3/24/2023 9:12:47 AM      Pregnancy test,urine (POC) 03/21/2023 Negative  NEG   Final    Glucose (POC) 03/21/2023 297 (A)  65 - 117 mg/dL Final    Comment: (NOTE)  The FDA has indicated that no capillary point of care blood glucose  monitoring systems are approved for use in \"critically ill\" patients,  however they have not defined this population. The College of  American Pathologists has recommended that these devices should not  be used in cases such as severe hypotension, dehydration, shock, and  hyperglycemic-hyperosmolar state, amongst others. Venous or arterial  collection is the recommended specimen for testing these patients. Performed by 03/21/2023 Anay Elmore RN   Final    SAMPLES BEING HELD 03/21/2023  1SST   Final    COMMENT 03/21/2023 Add-on orders for these samples will be processed based on acceptable specimen integrity and analyte stability, which may vary by analyte.     Final    pH, venous (POC) 03/21/2023 7.39  7.32 - 7.42   Final    pCO2, venous (POC) 03/21/2023 42.8  41 - 51 MMHG Final    pO2, venous (POC) 03/21/2023 30  25 - 40 mmHg Final    HCO3, venous (POC) 03/21/2023 26.2  23.0 - 28.0 MMOL/L Final    sO2, venous (POC) 03/21/2023 56.7 (A)  65 - 88 % Final    Base excess, venous (POC) 03/21/2023 0.9  mmol/L Final    Patient temp. 03/21/2023 98    Final    Specimen type (POC) 03/21/2023 VENOUS BLOOD    Final    Performed by 03/21/2023 Shanda Lee MD  [facility]  04/04/23 5:25 PM    Total time on the date of encounter exceeded 60 minutes and included patient care, coordination of care, charting and preparation for visit. Portions of this note may have been populated using smart dictation software and may have \"sounds-like\" errors present. Pt was counseled on risks, benefits and alternatives of treatment options. All questions were asked and answered and the patient was agreeable with the treatment plan as outlined.

## 2023-04-04 NOTE — PROGRESS NOTES
Fracisco Fair is a 42 y.o. female    Chief Complaint   Patient presents with    New Patient    Memory Loss     Forgetting things and \"going blank and unable to be \"awakened\" when people call her name; blurred vision x 1 yr; gotten worse in last 4 months       Visit Vitals  BP (!) 158/95   Pulse 81   Temp 97.2 °F (36.2 °C) (Temporal)   Resp 18   Ht 5' 2\" (1.575 m)   Wt 173 lb (78.5 kg)   SpO2 99%   BMI 31.64 kg/m²       3 most recent PHQ Screens 4/4/2023   Little interest or pleasure in doing things Several days   Feeling down, depressed, irritable, or hopeless Several days   Total Score PHQ 2 2       No flowsheet data found. Abuse Screening Questionnaire 11/4/2019   Do you ever feel afraid of your partner? N   Are you in a relationship with someone who physically or mentally threatens you? N   Is it safe for you to go home? Y       1. Have you been to the ER, urgent care clinic since your last visit? Hospitalized since your last visit? Yes [facility] 03/23/23 for high blood sugar    2. Have you seen or consulted any other health care providers outside of the [address] [place] [address] since your last visit? Include any pap smears or colon screening.  No

## 2023-04-13 ENCOUNTER — TELEPHONE (OUTPATIENT)
Dept: FAMILY MEDICINE CLINIC | Age: 38
End: 2023-04-13

## 2023-04-19 ENCOUNTER — OFFICE VISIT (OUTPATIENT)
Dept: FAMILY MEDICINE CLINIC | Age: 38
End: 2023-04-19
Payer: COMMERCIAL

## 2023-04-19 VITALS
SYSTOLIC BLOOD PRESSURE: 144 MMHG | HEART RATE: 73 BPM | OXYGEN SATURATION: 98 % | RESPIRATION RATE: 20 BRPM | TEMPERATURE: 98 F | WEIGHT: 172 LBS | HEIGHT: 62 IN | BODY MASS INDEX: 31.65 KG/M2 | DIASTOLIC BLOOD PRESSURE: 81 MMHG

## 2023-04-19 DIAGNOSIS — I15.9 SECONDARY HYPERTENSION: ICD-10-CM

## 2023-04-19 DIAGNOSIS — E11.65 TYPE 2 DIABETES MELLITUS WITH HYPERGLYCEMIA, WITHOUT LONG-TERM CURRENT USE OF INSULIN (HCC): Primary | ICD-10-CM

## 2023-04-19 PROCEDURE — 99214 OFFICE O/P EST MOD 30 MIN: CPT | Performed by: FAMILY MEDICINE

## 2023-04-19 PROCEDURE — 3046F HEMOGLOBIN A1C LEVEL >9.0%: CPT | Performed by: FAMILY MEDICINE

## 2023-04-19 NOTE — ASSESSMENT & PLAN NOTE
improved, not at goal glucose <120, we will institute low dose Trulicity and continue metformin 1000mg BID, and will keep more track of glucose at home, work with diabetes educator, and follow up in 4 weeks.

## 2023-04-19 NOTE — PROGRESS NOTES
John Mcrae (: 1985) is a 42 y.o. female, established patient, here for evaluation of the following chief complaint(s):  Results (Discuss lab results )       ASSESSMENT/PLAN:  Below is the assessment and plan developed based on review of pertinent history, physical exam, labs, studies, and medications. 1. Type 2 diabetes mellitus with hyperglycemia, without long-term current use of insulin ([facility] [address]  Assessment & Plan:   improved, not at goal glucose <120, we will institute low dose Trulicity and continue metformin 1000mg BID, and will keep more track of glucose at home, work with diabetes educator, and follow up in 4 weeks. 2. Secondary hypertension  Assessment & Plan:   improving, continue to work on dietary choices and weight loss. Return in about 4 weeks (around 2023) for DM management. SUBJECTIVE/OBJECTIVE:  HPI  Chronic Conditions Addressed Today       1. Type 2 diabetes mellitus with hyperglycemia, without long-term current use of insulin ([facility]) - Primary     Overview      Lab Results   Component Value Date/Time    Hemoglobin A1c (POC) 8.6 2019 12:02 PM     Lab Results   Component Value Date/Time    Glucose 340 (H) 2023 10:16 PM   Medications: metformin 1000mg BID    Interval Hx: gestational diabetes back in . Patient has family hx of diabetes type 2. Recent ED visit on  for 4 months of memory, foggy brain, with 2 months of blurred vision with polyuria/polydypsia. Denies abdominal pain, nausea, vomiting. C-peptide normal.  Patient has been instituting lower carb diet since last visit, fasting glucose 130 now. Did not yet qualify for wegovy for weight loss/diabetes, but does for Trulicity, so will try this first.   Above symptoms have greatly improved. Missed the diabetes educator call, will get back to them.                     2. Secondary hypertension     Overview      No previous medications, no symptoms today though patient does get headaches and chest discomfort, evaluated in ER recently during chest discomfort, no EKG or troponin findings. Patient BP improved today further without intervention. Review of Systems  As per HPI, otherweise negative ROS    Physical Exam  Visit Vitals  BP (!) 144/81   Pulse 73   Temp 98 °F (36.7 °C) (Temporal)   Resp 20   Ht 5' 2\" (1.575 m)   Wt 172 lb (78 kg)   SpO2 98%   Breastfeeding No   BMI 31.46 kg/m²     Constitutional: well-appearing, no acute distress  Eyes: EOM intact. PERRL bilateral. Not icteric. Cardiac: normal rate, regular rhythm. Pulm: normal rate, normal effort. Skin: normal color and turgor. Lower extrem: no edema. An electronic signature was used to authenticate this note.   -- Saintclair Llano, MD Statement Selected

## 2023-05-22 ENCOUNTER — OFFICE VISIT (OUTPATIENT)
Facility: CLINIC | Age: 38
End: 2023-05-22
Payer: MEDICAID

## 2023-05-22 VITALS
RESPIRATION RATE: 16 BRPM | TEMPERATURE: 97.2 F | BODY MASS INDEX: 31.1 KG/M2 | DIASTOLIC BLOOD PRESSURE: 83 MMHG | SYSTOLIC BLOOD PRESSURE: 129 MMHG | HEART RATE: 80 BPM | WEIGHT: 169 LBS | HEIGHT: 62 IN

## 2023-05-22 DIAGNOSIS — R11.0 NAUSEA: ICD-10-CM

## 2023-05-22 DIAGNOSIS — I15.9 SECONDARY HYPERTENSION: ICD-10-CM

## 2023-05-22 DIAGNOSIS — E11.65 TYPE 2 DIABETES MELLITUS WITH HYPERGLYCEMIA, WITHOUT LONG-TERM CURRENT USE OF INSULIN (HCC): Primary | ICD-10-CM

## 2023-05-22 DIAGNOSIS — B35.1 ONYCHOMYCOSIS: ICD-10-CM

## 2023-05-22 PROCEDURE — 3046F HEMOGLOBIN A1C LEVEL >9.0%: CPT | Performed by: FAMILY MEDICINE

## 2023-05-22 PROCEDURE — 99214 OFFICE O/P EST MOD 30 MIN: CPT | Performed by: FAMILY MEDICINE

## 2023-05-22 RX ORDER — BLOOD-GLUCOSE METER
EACH MISCELLANEOUS
COMMUNITY
Start: 2023-04-07

## 2023-05-22 RX ORDER — GLUCOSAM/CHON-MSM1/C/MANG/BOSW 500-416.6
TABLET ORAL
COMMUNITY
Start: 2023-04-05

## 2023-05-22 RX ORDER — ONDANSETRON 4 MG/1
4 TABLET, ORALLY DISINTEGRATING ORAL EVERY 8 HOURS PRN
Status: SHIPPED | OUTPATIENT
Start: 2023-05-22 | End: 2023-06-05

## 2023-05-22 RX ORDER — DULAGLUTIDE 0.75 MG/.5ML
INJECTION, SOLUTION SUBCUTANEOUS
Qty: 4 ML | Refills: 0 | Status: SHIPPED | OUTPATIENT
Start: 2023-05-22

## 2023-05-22 RX ORDER — DULAGLUTIDE 0.75 MG/.5ML
INJECTION, SOLUTION SUBCUTANEOUS
COMMUNITY
Start: 2023-04-19 | End: 2023-05-22 | Stop reason: SDUPTHER

## 2023-05-22 RX ORDER — CALCIUM CITRATE/VITAMIN D3 200MG-6.25
TABLET ORAL
COMMUNITY
Start: 2023-05-03

## 2023-05-22 NOTE — PROGRESS NOTES
ASSESSMENT/PLAN:  Ms. Martin Shepherd is a 42 y.o. female established patient who presents for Follow-up (One month follow up feeling \"pretty good\" still having some vertigo 3/week/)  , found to have the followin. Type 2 diabetes mellitus with hyperglycemia, without long-term current use of insulin (HCC)  -     Hemoglobin A1C; Future  -     Lipid Panel; Future  -     Microalbumin / Creatinine Urine Ratio; Future  -     External Referral To Ophthalmology  -     Comprehensive Metabolic Panel; Future  -     Hepatitis C Antibody; Future  2. Nausea  -     ondansetron (ZOFRAN-ODT) disintegrating tablet 4 mg; 4 mg, Oral, EVERY 8 HOURS PRN, Starting on 23 at 1129, Until 23 at 1128, Nausea, Vomiting  3. Secondary hypertension  Assessment & Plan:   Well-controlled, continue current medications, medication adherence emphasized and lifestyle modifications recommended        It was a pleasure seeing Ms. Martin Shepherd today. No follow-up provider specified. SUBJECTIVE:     HPI  Ms. Martin Shepherd is a 42 y.o. female established patient who presents for the following concerns:    1. Type 2 diabetes mellitus with hyperglycemia, without long-term current use of insulin (HCC)  Overview:  Background: gestational diabetes back in 2020. Patient has family hx of diabetes type 2. Recent ED visit on  for 4 months of memory, foggy brain, with 2 months of blurred vision with polyuria/polydypsia. C-peptide: normal.    Hemoglobin A1C   Date Value Ref Range Status   2023 9.7 (H) 4.0 - 5.6 % Final     Comment:     NEW METHOD  PLEASE NOTE NEW REFERENCE RANGE  (NOTE)  HbA1C Interpretive Ranges  <5.7              Normal  5.7 - 6.4         Consider Prediabetes  >6.5              Consider Diabetes         No results found for: GLUF    Medications: metformin 1000mg BID    Interval Hx: Patient has been instituting lower carb diet since last visit, fasting glucose 130 now. Prescribed Trulicity.  Had upset

## 2023-06-14 DIAGNOSIS — E11.65 TYPE 2 DIABETES MELLITUS WITH HYPERGLYCEMIA, WITHOUT LONG-TERM CURRENT USE OF INSULIN (HCC): ICD-10-CM

## 2023-06-14 LAB
ALBUMIN SERPL-MCNC: 4 G/DL (ref 3.5–5)
ALBUMIN/GLOB SERPL: 1 (ref 1.1–2.2)
ALP SERPL-CCNC: 58 U/L (ref 45–117)
ALT SERPL-CCNC: 35 U/L (ref 12–78)
ANION GAP SERPL CALC-SCNC: 7 MMOL/L (ref 5–15)
AST SERPL-CCNC: 16 U/L (ref 15–37)
BILIRUB SERPL-MCNC: 0.4 MG/DL (ref 0.2–1)
BUN SERPL-MCNC: 9 MG/DL (ref 6–20)
BUN/CREAT SERPL: 17 (ref 12–20)
CALCIUM SERPL-MCNC: 9 MG/DL (ref 8.5–10.1)
CHLORIDE SERPL-SCNC: 105 MMOL/L (ref 97–108)
CHOLEST SERPL-MCNC: 177 MG/DL
CO2 SERPL-SCNC: 27 MMOL/L (ref 21–32)
CREAT SERPL-MCNC: 0.52 MG/DL (ref 0.55–1.02)
GLOBULIN SER CALC-MCNC: 4 G/DL (ref 2–4)
GLUCOSE SERPL-MCNC: 92 MG/DL (ref 65–100)
HDLC SERPL-MCNC: 44 MG/DL
HDLC SERPL: 4 (ref 0–5)
LDLC SERPL CALC-MCNC: 112.6 MG/DL (ref 0–100)
POTASSIUM SERPL-SCNC: 4 MMOL/L (ref 3.5–5.1)
PROT SERPL-MCNC: 8 G/DL (ref 6.4–8.2)
SODIUM SERPL-SCNC: 139 MMOL/L (ref 136–145)
TRIGL SERPL-MCNC: 102 MG/DL
VLDLC SERPL CALC-MCNC: 20.4 MG/DL

## 2023-06-15 LAB
CREAT UR-MCNC: 160 MG/DL
EST. AVERAGE GLUCOSE BLD GHB EST-MCNC: 117 MG/DL
HBA1C MFR BLD: 5.7 % (ref 4–5.6)
HCV AB SERPL QL IA: NONREACTIVE
MICROALBUMIN UR-MCNC: 1.14 MG/DL
MICROALBUMIN/CREAT UR-RTO: 7 MG/G (ref 0–30)

## 2023-07-17 ENCOUNTER — PATIENT MESSAGE (OUTPATIENT)
Facility: CLINIC | Age: 38
End: 2023-07-17

## 2023-07-18 RX ORDER — DULAGLUTIDE 0.75 MG/.5ML
INJECTION, SOLUTION SUBCUTANEOUS
Qty: 4 ML | Refills: 0 | Status: SHIPPED | OUTPATIENT
Start: 2023-07-18

## 2023-07-18 NOTE — TELEPHONE ENCOUNTER
From: Cameron Valentine  To: Dr. Blue Prashant: 7/17/2023 10:17 AM EDT  Subject: I have no more insulin    Good morning Doctor wanted to ask if you could please send a prescription to my pharmacy for my insulin I put the last one last Monday and have nothing for today please and thank you

## 2023-07-25 ENCOUNTER — OFFICE VISIT (OUTPATIENT)
Facility: CLINIC | Age: 38
End: 2023-07-25
Payer: COMMERCIAL

## 2023-07-25 ENCOUNTER — TELEPHONE (OUTPATIENT)
Facility: CLINIC | Age: 38
End: 2023-07-25

## 2023-07-25 VITALS
OXYGEN SATURATION: 99 % | BODY MASS INDEX: 30.36 KG/M2 | WEIGHT: 165 LBS | SYSTOLIC BLOOD PRESSURE: 129 MMHG | TEMPERATURE: 97.3 F | HEART RATE: 79 BPM | RESPIRATION RATE: 16 BRPM | HEIGHT: 62 IN | DIASTOLIC BLOOD PRESSURE: 94 MMHG

## 2023-07-25 DIAGNOSIS — R10.32 INTERMITTENT LEFT LOWER QUADRANT ABDOMINAL PAIN: ICD-10-CM

## 2023-07-25 DIAGNOSIS — E11.65 TYPE 2 DIABETES MELLITUS WITH HYPERGLYCEMIA, WITHOUT LONG-TERM CURRENT USE OF INSULIN (HCC): Primary | ICD-10-CM

## 2023-07-25 DIAGNOSIS — E78.00 PURE HYPERCHOLESTEROLEMIA: ICD-10-CM

## 2023-07-25 DIAGNOSIS — B35.1 ONYCHOMYCOSIS: ICD-10-CM

## 2023-07-25 DIAGNOSIS — H81.12 BPPV (BENIGN PAROXYSMAL POSITIONAL VERTIGO), LEFT: ICD-10-CM

## 2023-07-25 PROCEDURE — 99215 OFFICE O/P EST HI 40 MIN: CPT | Performed by: FAMILY MEDICINE

## 2023-07-25 PROCEDURE — 3044F HG A1C LEVEL LT 7.0%: CPT | Performed by: FAMILY MEDICINE

## 2023-07-25 RX ORDER — ROSUVASTATIN CALCIUM 5 MG/1
5 TABLET, COATED ORAL WEEKLY
Qty: 90 TABLET | Refills: 1 | Status: SHIPPED | OUTPATIENT
Start: 2023-07-25

## 2023-07-25 RX ORDER — TERBINAFINE HYDROCHLORIDE 250 MG/1
250 TABLET ORAL DAILY
Qty: 84 TABLET | Refills: 0 | Status: SHIPPED | OUTPATIENT
Start: 2023-07-25 | End: 2023-10-17

## 2023-07-25 RX ORDER — DULAGLUTIDE 0.75 MG/.5ML
0.75 INJECTION, SOLUTION SUBCUTANEOUS WEEKLY
Qty: 4 ML | Refills: 3 | Status: SHIPPED | OUTPATIENT
Start: 2023-07-25

## 2023-07-25 NOTE — ASSESSMENT & PLAN NOTE
positive osmani-halpike, and L side eply is helping. continue home eply maneuver, educatio nprovided. follow up in 2 months.

## 2023-07-25 NOTE — PROGRESS NOTES
Chief Complaint   Patient presents with    Follow-up    Diabetes     DM refill gluc controlled [address].  Pt needs refill on insulin

## 2023-07-25 NOTE — PROGRESS NOTES
ASSESSMENT/PLAN:  Ms. Elaine Barrett is a 42 y.o. female established patient who presents for Follow-up and Diabetes (DM refill gluc controlled ave 90. Pt needs refill on insulin /)  , found to have the followin. Type 2 diabetes mellitus with hyperglycemia, without long-term current use of insulin (HCC)  Assessment & Plan:   Well-controlled, continue current medications and continue current treatment plan. Get foot exam next visit and check on eye exam progress. 2. Onychomycosis  Assessment & Plan:   Uncontrolled, start lamisil 250mg daily for 12 weeks, get repeat CMP in 6 weeks, as also starting crestor weekly. Continue working on diabetes improvements. Orders:  -     Comprehensive Metabolic Panel; Future  3. Pure hypercholesterolemia  Assessment & Plan:   Uncontrolled, medication adherence emphasized start Crestor 5mg weekly. Orders:  -     Lipid Panel; Future  4. BPPV (benign paroxysmal positional vertigo), left  Assessment & Plan:   positive osmani-halpike, and L side eply is helping. continue home eply maneuver, educatio nprovided. follow up in 2 months. 5. Intermittent left lower quadrant abdominal pain  Assessment & Plan:   Broad differential. I do think there seems to be abdominal wall injury/inflammatoion component given positive Carnett's sign. I am concerned by level of discomfort patient had two weeks ago, but glad it is greatly improved. Seems less likely diverticulitis or constipation, though not ruled out. Creatinine studies recently do not show renal injury and no associated symptoms. Glucose well controlled so UTI unlikely. Plan: abdominal ct scan, topical pain medication, work on constipation control. Orders:  -     CT ABDOMEN PELVIS WO CONTRAST Additional Contrast? Radiologist Recommendation; Future        It was a pleasure seeing Ms. Elaine Barrett today. Return in about 2 months (around 2023) for lab review and chronic disease management.       SUBJECTIVE:     HPI  Ms.

## 2023-07-25 NOTE — ASSESSMENT & PLAN NOTE
Uncontrolled, start lamisil 250mg daily for 12 weeks, get repeat CMP in 6 weeks, as also starting crestor weekly. Continue working on diabetes improvements.

## 2023-07-25 NOTE — ASSESSMENT & PLAN NOTE
Broad differential. I do think there seems to be abdominal wall injury/inflammatoion component given positive Carnett's sign. I am concerned by level of discomfort patient had two weeks ago, but glad it is greatly improved. Seems less likely diverticulitis or constipation, though not ruled out. Creatinine studies recently do not show renal injury and no associated symptoms. Glucose well controlled so UTI unlikely. Plan: abdominal ct scan, topical pain medication, work on constipation control.

## 2023-07-25 NOTE — TELEPHONE ENCOUNTER
PA for truilicity has been started     Your demographic data has been sent to [facility] successfully! [employer] typically takes 5-10 minutes to respond, but it may take a little longer in some cases. You will be notified by email when available. You can also check for an update later by opening this request from your dashboard. Please do not fax or call [facility] to resubmit this request. If you need assistance, please chat with [facility] or call us at [phone]. If it has been longer than 24 hours, please reach out to [facility].

## 2023-07-25 NOTE — TELEPHONE ENCOUNTER
PA clinical questions have been answered and PA submitted. Awaiting response from insurance. Approved  07/25/2023-07/25/2024.

## 2023-07-27 ENCOUNTER — PATIENT MESSAGE (OUTPATIENT)
Facility: CLINIC | Age: 38
End: 2023-07-27

## 2023-07-31 RX ORDER — TERBINAFINE HYDROCHLORIDE 250 MG/1
250 TABLET ORAL DAILY
Qty: 90 TABLET | Refills: 0 | Status: SHIPPED | OUTPATIENT
Start: 2023-07-31 | End: 2023-10-23

## 2023-07-31 RX ORDER — DULAGLUTIDE 0.75 MG/.5ML
INJECTION, SOLUTION SUBCUTANEOUS
Qty: 8 ML | Refills: 0 | Status: SHIPPED | OUTPATIENT
Start: 2023-07-31

## 2023-07-31 RX ORDER — ROSUVASTATIN CALCIUM 5 MG/1
5 TABLET, COATED ORAL WEEKLY
Qty: 90 TABLET | Refills: 1 | Status: SHIPPED | OUTPATIENT
Start: 2023-07-31

## 2023-07-31 NOTE — TELEPHONE ENCOUNTER
From: Susan Vaughan  To: Dr. Santy Arteaga: 7/27/2023 9:44 PM EDT  Subject: Prescription not sent     [facility] Doctor I went to see if my prescriptions were ready and they said they did not have any prescriptions for me. They asked me to let you know and find out from you what happened.

## 2023-08-09 ENCOUNTER — HOSPITAL ENCOUNTER (OUTPATIENT)
Facility: HOSPITAL | Age: 38
Discharge: HOME | End: 2023-08-12
Attending: FAMILY MEDICINE
Payer: COMMERCIAL

## 2023-08-09 DIAGNOSIS — R10.32 INTERMITTENT LEFT LOWER QUADRANT ABDOMINAL PAIN: ICD-10-CM

## 2023-08-09 PROCEDURE — 74176 CT ABD & PELVIS W/O CONTRAST: CPT

## 2023-09-27 ENCOUNTER — OFFICE VISIT (OUTPATIENT)
Facility: CLINIC | Age: 38
End: 2023-09-27
Payer: COMMERCIAL

## 2023-09-27 VITALS
HEART RATE: 80 BPM | HEIGHT: 62 IN | WEIGHT: 168 LBS | OXYGEN SATURATION: 97 % | BODY MASS INDEX: 30.91 KG/M2 | DIASTOLIC BLOOD PRESSURE: 90 MMHG | SYSTOLIC BLOOD PRESSURE: 125 MMHG | TEMPERATURE: 97.5 F | RESPIRATION RATE: 20 BRPM

## 2023-09-27 DIAGNOSIS — E11.65 TYPE 2 DIABETES MELLITUS WITH HYPERGLYCEMIA, WITHOUT LONG-TERM CURRENT USE OF INSULIN (HCC): Primary | ICD-10-CM

## 2023-09-27 DIAGNOSIS — I10 PRIMARY HYPERTENSION: ICD-10-CM

## 2023-09-27 DIAGNOSIS — E78.00 PURE HYPERCHOLESTEROLEMIA: ICD-10-CM

## 2023-09-27 DIAGNOSIS — B35.1 ONYCHOMYCOSIS: ICD-10-CM

## 2023-09-27 PROCEDURE — 3074F SYST BP LT 130 MM HG: CPT | Performed by: FAMILY MEDICINE

## 2023-09-27 PROCEDURE — 99214 OFFICE O/P EST MOD 30 MIN: CPT | Performed by: FAMILY MEDICINE

## 2023-09-27 PROCEDURE — 3080F DIAST BP >= 90 MM HG: CPT | Performed by: FAMILY MEDICINE

## 2023-09-27 PROCEDURE — 3044F HG A1C LEVEL LT 7.0%: CPT | Performed by: FAMILY MEDICINE

## 2023-09-27 RX ORDER — ROSUVASTATIN CALCIUM 5 MG/1
5 TABLET, COATED ORAL DAILY
Qty: 90 TABLET | Refills: 3 | Status: SHIPPED | OUTPATIENT
Start: 2023-09-27

## 2023-09-27 RX ORDER — TERBINAFINE HYDROCHLORIDE 250 MG/1
250 TABLET ORAL DAILY
Qty: 90 TABLET | Refills: 0 | Status: SHIPPED | OUTPATIENT
Start: 2023-09-27 | End: 2023-12-20

## 2023-09-27 RX ORDER — DULAGLUTIDE 1.5 MG/.5ML
1.5 INJECTION, SOLUTION SUBCUTANEOUS WEEKLY
Qty: 2 ML | Refills: 3 | Status: SHIPPED | OUTPATIENT
Start: 2023-09-27

## 2023-09-27 ASSESSMENT — PATIENT HEALTH QUESTIONNAIRE - PHQ9
1. LITTLE INTEREST OR PLEASURE IN DOING THINGS: 0
SUM OF ALL RESPONSES TO PHQ9 QUESTIONS 1 & 2: 0
SUM OF ALL RESPONSES TO PHQ QUESTIONS 1-9: 0
2. FEELING DOWN, DEPRESSED OR HOPELESS: 0
SUM OF ALL RESPONSES TO PHQ QUESTIONS 1-9: 0

## 2023-09-27 NOTE — PROGRESS NOTES
Chief Complaint   Patient presents with    Follow-up     2 mo fu still needs to have labs drawn .   No issues or concerns for today
in 2020. Patient has family hx of diabetes type 2. Recent ED visit on 03/21 for 4 months of memory, foggy brain, with 2 months of blurred vision with polyuria/polydypsia. C-peptide: normal.    Hemoglobin A1C   Date Value Ref Range Status   06/14/2023 5.7 (H) 4.0 - 5.6 % Final     Comment:     NEW METHOD  PLEASE NOTE NEW REFERENCE RANGE  (NOTE)  HbA1C Interpretive Ranges  <5.7              Normal  5.7 - 6.4         Consider Prediabetes  >6.5              Consider Diabetes         No results found for: GLUF    Medications: metformin 3707PP BID, Trulicity 0.73AH weekly    Interval Hx: Taking medications, low carb diet. Most recent FBS: unsure. But thinks it is a bit higher, has slipped back into some bread eating, gained a couple pounds. 2. Onychomycosis  Overview:  Bilateral feet, present for a year or more. Has tried topical treatments without success. Recent liver labs normal. Would like to start oral medication therapy. Interval Hx:  - did not receive medication, needs new script. Nails unchanged. 3. Pure hypercholesterolemia  Overview:  Lab Results   Component Value Date    CHOL 177 06/14/2023     Lab Results   Component Value Date    TRIG 102 06/14/2023     Lab Results   Component Value Date    HDL 44 06/14/2023     Lab Results   Component Value Date    LDLCALC 112.6 (H) 06/14/2023     Lab Results   Component Value Date    LABVLDL 20.4 06/14/2023     Lab Results   Component Value Date    CHOLHDLRATIO 4.0 06/14/2023     Medication: Crestor 5mg weekly. Comorbidities: DM2      4. Primary hypertension  Overview:  BP Readings from Last 3 Encounters:   09/27/23 (!) 125/90   07/25/23 (!) 129/94   05/22/23 129/83     Medications: none    Interval Hx: patient working on lifestyle change and does not desire medication therapy at this point, has DM and HLD but DM is getting more under control with Trulicity and lifestyle changes. Weight is coming down.         The following portions of the patient's history

## 2023-09-27 NOTE — ASSESSMENT & PLAN NOTE
Borderline controlled, not yet readcy for BP medication, though if not improving by next visit will discuss low dose olmesartan or valsartan. for diabetic renal protection.

## 2023-09-27 NOTE — ASSESSMENT & PLAN NOTE
Uncertain control. Patient is tolerating once weekly Crestor 5mg. Will increase to 5mg daily, get lipid panel performed in 6 weeks, and reassess. Goal LDL < 70.

## 2023-09-27 NOTE — ASSESSMENT & PLAN NOTE
Uncontrolled, continue current medications and continue current treatment plan. Get repeat liver labs in 6 weeks, review in 12 weeks.

## 2023-09-27 NOTE — ASSESSMENT & PLAN NOTE
Unclear control, changes made today: has finished the 1 month of 39.5QE weekly Trulicity, and given some weight gain, we have discussed going up to the next stage of Trulicity from 9.56GT weekly to 1.5mg weekly. anthony is in agreement. will watch out for signs of low blood sugar. Patient able to teach back hypoglycemia symptoms. Foot Exam completed today, onychomycosis present will treat.

## 2024-12-19 NOTE — PROGRESS NOTES
Postpartum evaluation    Jose F Browning is a 37 y.o. female who presents for a postpartum exam.     She is now six weeks post primary  section. Her baby is doing well. She has had no menses since delivery. She has had the following significant problems since her delivery: none    The patient is bottle feeding without difficulty. She is currently taking: no medications. She is due for her next AE in 12 months. Visit Vitals  /82   Wt 171 lb (77.6 kg)   Breastfeeding No   BMI 31.28 kg/m²       PHYSICAL EXAMINATION    Constitutional  · Appearance: well-nourished, well developed, alert, in no acute distress    HENT  · Head and Face: appears normal    Neck  · Inspection/Palpation: normal appearance, no masses or tenderness  · Lymph Nodes: no lymphadenopathy present  · Thyroid: gland size normal, nontender, no nodules or masses present on palpation    Gastrointestinal  · Abdominal Examination: abdomen non-tender to palpation, normal bowel sounds, no masses present  · Liver and spleen: no hepatomegaly present, spleen not palpable  · Hernias: no hernias identified    Skin  · General Inspection: no rash, no lesions identified    Neurologic/Psychiatric  · Mental Status:  · Orientation: grossly oriented to person, place and time  · Mood and Affect: mood normal, affect appropriate    Assessment:  Normal postpartum check    Plan:  RTO for AE.
100

## (undated) DEVICE — ROCKER SWITCH PENCIL HOLSTER: Brand: VALLEYLAB

## (undated) DEVICE — SOLUTION IV 1000ML 0.9% SOD CHL

## (undated) DEVICE — HANDLE LT SNAP ON ULT DURABLE LENS FOR TRUMPF ALC DISPOSABLE

## (undated) DEVICE — 3000CC GUARDIAN II: Brand: GUARDIAN

## (undated) DEVICE — TIP CLEANER: Brand: VALLEYLAB

## (undated) DEVICE — STERILE POLYISOPRENE POWDER-FREE SURGICAL GLOVES: Brand: PROTEXIS

## (undated) DEVICE — SYRINGE IRRIG 60ML SFT PLIABLE BLB EZ TO GRP 1 HND USE W/

## (undated) DEVICE — SUTURE MCRYL SZ 4 0 L18IN ABSRB VLT PS 1 L24MM 3 8 CIR REV Y682H

## (undated) DEVICE — SUTURE VCRL SZ 0 L36IN ABSRB VLT L40MM CT 1/2 CIR J358H

## (undated) DEVICE — SOLIDIFIER FLUID 3000 CC ABSORB

## (undated) DEVICE — REM POLYHESIVE ADULT PATIENT RETURN ELECTRODE: Brand: VALLEYLAB

## (undated) DEVICE — TOWEL,OR,DSP,ST,BLUE,STD,2/PK,40PK/CS: Brand: MEDLINE

## (undated) DEVICE — GARMENT,MEDLINE,DVT,INT,CALF,MED, GEN2: Brand: MEDLINE

## (undated) DEVICE — BLADE ASSEMB CLP HAIR FINE --

## (undated) DEVICE — TRAY,URINE METER,100% SILICONE,16FR10ML: Brand: MEDLINE

## (undated) DEVICE — C-SECTION II-LF: Brand: MEDLINE INDUSTRIES, INC.

## (undated) DEVICE — STRIP,CLOSURE,WOUND,MEDI-STRIP,1/2X4: Brand: MEDLINE

## (undated) DEVICE — SUTURE PDS II SZ 0 L60IN ABSRB VLT L48MM CTX 1/2 CIR Z990G

## (undated) DEVICE — SUTURE MCRYL 2 0 L27IN ABSRB VLT CT MFIL Y351H

## (undated) DEVICE — MASTISOL ADHESIVE LIQ 2/3ML

## (undated) DEVICE — SUTURE MCRYL SZ 0 L36IN ABSRB UD L36MM CT-1 1/2 CIR Y946H

## (undated) DEVICE — (D)PREP SKN CHLRAPRP APPL 26ML -- CONVERT TO ITEM 371833